# Patient Record
Sex: MALE | Race: WHITE | NOT HISPANIC OR LATINO | URBAN - METROPOLITAN AREA
[De-identification: names, ages, dates, MRNs, and addresses within clinical notes are randomized per-mention and may not be internally consistent; named-entity substitution may affect disease eponyms.]

---

## 2018-02-02 ENCOUNTER — HOSPITAL ENCOUNTER (EMERGENCY)
Facility: CLINIC | Age: 28
Discharge: OTHER INSTITUTION | End: 2018-02-03
Attending: EMERGENCY MEDICINE | Admitting: EMERGENCY MEDICINE
Payer: COMMERCIAL

## 2018-02-02 DIAGNOSIS — F29 PSYCHOSIS, UNSPECIFIED PSYCHOSIS TYPE (H): ICD-10-CM

## 2018-02-02 DIAGNOSIS — F19.10 DRUG ABUSE (H): ICD-10-CM

## 2018-02-02 PROCEDURE — 90791 PSYCH DIAGNOSTIC EVALUATION: CPT

## 2018-02-02 PROCEDURE — 80307 DRUG TEST PRSMV CHEM ANLYZR: CPT | Performed by: EMERGENCY MEDICINE

## 2018-02-02 PROCEDURE — 99285 EMERGENCY DEPT VISIT HI MDM: CPT | Mod: 25

## 2018-02-02 ASSESSMENT — ENCOUNTER SYMPTOMS
CONFUSION: 1
HALLUCINATIONS: 0
NERVOUS/ANXIOUS: 1

## 2018-02-03 ENCOUNTER — APPOINTMENT (OUTPATIENT)
Dept: GENERAL RADIOLOGY | Facility: CLINIC | Age: 28
End: 2018-02-03
Attending: EMERGENCY MEDICINE
Payer: COMMERCIAL

## 2018-02-03 ENCOUNTER — HOSPITAL ENCOUNTER (INPATIENT)
Facility: CLINIC | Age: 28
LOS: 3 days | Discharge: HOME | DRG: 880 | End: 2018-02-06
Attending: PSYCHIATRY & NEUROLOGY | Admitting: PSYCHIATRY & NEUROLOGY
Payer: COMMERCIAL

## 2018-02-03 ENCOUNTER — HOSPITAL ENCOUNTER (EMERGENCY)
Facility: CLINIC | Age: 28
Discharge: ANOTHER HOSPITAL | End: 2018-02-03
Attending: EMERGENCY MEDICINE | Admitting: EMERGENCY MEDICINE
Payer: COMMERCIAL

## 2018-02-03 VITALS
SYSTOLIC BLOOD PRESSURE: 122 MMHG | DIASTOLIC BLOOD PRESSURE: 100 MMHG | TEMPERATURE: 98.5 F | BODY MASS INDEX: 25.77 KG/M2 | RESPIRATION RATE: 18 BRPM | WEIGHT: 180 LBS | OXYGEN SATURATION: 99 % | HEART RATE: 129 BPM | HEIGHT: 70 IN

## 2018-02-03 VITALS
TEMPERATURE: 99.7 F | OXYGEN SATURATION: 99 % | SYSTOLIC BLOOD PRESSURE: 121 MMHG | DIASTOLIC BLOOD PRESSURE: 76 MMHG | HEART RATE: 98 BPM | RESPIRATION RATE: 19 BRPM

## 2018-02-03 DIAGNOSIS — F29 PSYCHOSIS, UNSPECIFIED PSYCHOSIS TYPE (H): ICD-10-CM

## 2018-02-03 DIAGNOSIS — R00.0 TACHYCARDIA: ICD-10-CM

## 2018-02-03 LAB
ALBUMIN SERPL-MCNC: 4.6 G/DL (ref 3.4–5)
ALP SERPL-CCNC: 96 U/L (ref 40–150)
ALT SERPL W P-5'-P-CCNC: 20 U/L (ref 0–70)
AMPHETAMINES UR QL SCN: POSITIVE
ANION GAP SERPL CALCULATED.3IONS-SCNC: 11 MMOL/L (ref 3–14)
AST SERPL W P-5'-P-CCNC: 21 U/L (ref 0–45)
BARBITURATES UR QL: NEGATIVE
BASOPHILS # BLD AUTO: 0.1 10E9/L (ref 0–0.2)
BASOPHILS NFR BLD AUTO: 0.4 %
BENZODIAZ UR QL: NEGATIVE
BILIRUB SERPL-MCNC: 1.5 MG/DL (ref 0.2–1.3)
BUN SERPL-MCNC: 11 MG/DL (ref 7–30)
CALCIUM SERPL-MCNC: 9.3 MG/DL (ref 8.5–10.1)
CANNABINOIDS UR QL SCN: POSITIVE
CHLORIDE SERPL-SCNC: 102 MMOL/L (ref 94–109)
CO2 SERPL-SCNC: 23 MMOL/L (ref 20–32)
COCAINE UR QL: NEGATIVE
CREAT SERPL-MCNC: 0.78 MG/DL (ref 0.66–1.25)
DIFFERENTIAL METHOD BLD: ABNORMAL
EOSINOPHIL # BLD AUTO: 0.1 10E9/L (ref 0–0.7)
EOSINOPHIL NFR BLD AUTO: 0.4 %
ERYTHROCYTE [DISTWIDTH] IN BLOOD BY AUTOMATED COUNT: 12.7 % (ref 10–15)
ETHANOL SERPL-MCNC: <0.01 G/DL
GFR SERPL CREATININE-BSD FRML MDRD: >90 ML/MIN/1.7M2
GLUCOSE SERPL-MCNC: 100 MG/DL (ref 70–99)
HCT VFR BLD AUTO: 49.1 % (ref 40–53)
HGB BLD-MCNC: 16.5 G/DL (ref 13.3–17.7)
IMM GRANULOCYTES # BLD: 0.1 10E9/L (ref 0–0.4)
IMM GRANULOCYTES NFR BLD: 0.3 %
LIPASE SERPL-CCNC: 289 U/L (ref 73–393)
LYMPHOCYTES # BLD AUTO: 1.7 10E9/L (ref 0.8–5.3)
LYMPHOCYTES NFR BLD AUTO: 10.4 %
MAGNESIUM SERPL-MCNC: 2.5 MG/DL (ref 1.6–2.3)
MCH RBC QN AUTO: 29.5 PG (ref 26.5–33)
MCHC RBC AUTO-ENTMCNC: 33.6 G/DL (ref 31.5–36.5)
MCV RBC AUTO: 88 FL (ref 78–100)
MONOCYTES # BLD AUTO: 1.3 10E9/L (ref 0–1.3)
MONOCYTES NFR BLD AUTO: 8.2 %
NEUTROPHILS # BLD AUTO: 12.9 10E9/L (ref 1.6–8.3)
NEUTROPHILS NFR BLD AUTO: 80.3 %
NRBC # BLD AUTO: 0 10*3/UL
NRBC BLD AUTO-RTO: 0 /100
OPIATES UR QL SCN: NEGATIVE
PCP UR QL SCN: NEGATIVE
PLATELET # BLD AUTO: 350 10E9/L (ref 150–450)
POTASSIUM SERPL-SCNC: 3.3 MMOL/L (ref 3.4–5.3)
PROT SERPL-MCNC: 8 G/DL (ref 6.8–8.8)
RBC # BLD AUTO: 5.6 10E12/L (ref 4.4–5.9)
SODIUM SERPL-SCNC: 136 MMOL/L (ref 133–144)
WBC # BLD AUTO: 16.1 10E9/L (ref 4–11)

## 2018-02-03 PROCEDURE — 85025 COMPLETE CBC W/AUTO DIFF WBC: CPT | Performed by: EMERGENCY MEDICINE

## 2018-02-03 PROCEDURE — 96360 HYDRATION IV INFUSION INIT: CPT

## 2018-02-03 PROCEDURE — 96361 HYDRATE IV INFUSION ADD-ON: CPT

## 2018-02-03 PROCEDURE — 25000125 ZZHC RX 250: Performed by: EMERGENCY MEDICINE

## 2018-02-03 PROCEDURE — 99285 EMERGENCY DEPT VISIT HI MDM: CPT | Mod: 25

## 2018-02-03 PROCEDURE — 25000132 ZZH RX MED GY IP 250 OP 250 PS 637: Performed by: EMERGENCY MEDICINE

## 2018-02-03 PROCEDURE — 71046 X-RAY EXAM CHEST 2 VIEWS: CPT

## 2018-02-03 PROCEDURE — 12400001 ZZH R&B MH UMMC

## 2018-02-03 PROCEDURE — 80053 COMPREHEN METABOLIC PANEL: CPT | Performed by: EMERGENCY MEDICINE

## 2018-02-03 PROCEDURE — 25000128 H RX IP 250 OP 636: Performed by: EMERGENCY MEDICINE

## 2018-02-03 PROCEDURE — 25000132 ZZH RX MED GY IP 250 OP 250 PS 637: Performed by: PSYCHIATRY & NEUROLOGY

## 2018-02-03 PROCEDURE — 93005 ELECTROCARDIOGRAM TRACING: CPT

## 2018-02-03 PROCEDURE — 93005 ELECTROCARDIOGRAM TRACING: CPT | Mod: 76

## 2018-02-03 PROCEDURE — 83735 ASSAY OF MAGNESIUM: CPT | Performed by: EMERGENCY MEDICINE

## 2018-02-03 PROCEDURE — 83690 ASSAY OF LIPASE: CPT | Performed by: EMERGENCY MEDICINE

## 2018-02-03 PROCEDURE — 80320 DRUG SCREEN QUANTALCOHOLS: CPT | Performed by: EMERGENCY MEDICINE

## 2018-02-03 RX ORDER — POTASSIUM CHLORIDE 1.5 G/1.58G
40 POWDER, FOR SOLUTION ORAL ONCE
Status: COMPLETED | OUTPATIENT
Start: 2018-02-03 | End: 2018-02-03

## 2018-02-03 RX ORDER — NICOTINE 21 MG/24HR
1 PATCH, TRANSDERMAL 24 HOURS TRANSDERMAL DAILY
Status: DISCONTINUED | OUTPATIENT
Start: 2018-02-04 | End: 2018-02-06 | Stop reason: HOSPADM

## 2018-02-03 RX ORDER — TRAZODONE HYDROCHLORIDE 50 MG/1
50 TABLET, FILM COATED ORAL
Status: DISCONTINUED | OUTPATIENT
Start: 2018-02-03 | End: 2018-02-06 | Stop reason: HOSPADM

## 2018-02-03 RX ORDER — ALUMINA, MAGNESIA, AND SIMETHICONE 2400; 2400; 240 MG/30ML; MG/30ML; MG/30ML
30 SUSPENSION ORAL EVERY 4 HOURS PRN
Status: DISCONTINUED | OUTPATIENT
Start: 2018-02-03 | End: 2018-02-06 | Stop reason: HOSPADM

## 2018-02-03 RX ORDER — BISACODYL 10 MG
10 SUPPOSITORY, RECTAL RECTAL DAILY PRN
Status: DISCONTINUED | OUTPATIENT
Start: 2018-02-03 | End: 2018-02-06 | Stop reason: HOSPADM

## 2018-02-03 RX ORDER — SODIUM CHLORIDE 9 MG/ML
1000 INJECTION, SOLUTION INTRAVENOUS CONTINUOUS
Status: DISCONTINUED | OUTPATIENT
Start: 2018-02-03 | End: 2018-02-03 | Stop reason: HOSPADM

## 2018-02-03 RX ORDER — POTASSIUM CL/LIDO/0.9 % NACL 10MEQ/0.1L
10 INTRAVENOUS SOLUTION, PIGGYBACK (ML) INTRAVENOUS
Status: DISCONTINUED | OUTPATIENT
Start: 2018-02-03 | End: 2018-02-03 | Stop reason: HOSPADM

## 2018-02-03 RX ORDER — ACETAMINOPHEN 325 MG/1
650 TABLET ORAL EVERY 4 HOURS PRN
Status: DISCONTINUED | OUTPATIENT
Start: 2018-02-03 | End: 2018-02-06 | Stop reason: HOSPADM

## 2018-02-03 RX ORDER — NICOTINE 21 MG/24HR
1 PATCH, TRANSDERMAL 24 HOURS TRANSDERMAL DAILY
Status: DISCONTINUED | OUTPATIENT
Start: 2018-02-03 | End: 2018-02-03 | Stop reason: HOSPADM

## 2018-02-03 RX ORDER — HYDROXYZINE HYDROCHLORIDE 25 MG/1
25-50 TABLET, FILM COATED ORAL EVERY 4 HOURS PRN
Status: DISCONTINUED | OUTPATIENT
Start: 2018-02-03 | End: 2018-02-06 | Stop reason: HOSPADM

## 2018-02-03 RX ORDER — METOPROLOL TARTRATE 50 MG
50 TABLET ORAL ONCE
Status: COMPLETED | OUTPATIENT
Start: 2018-02-03 | End: 2018-02-03

## 2018-02-03 RX ORDER — OLANZAPINE 10 MG/2ML
10 INJECTION, POWDER, FOR SOLUTION INTRAMUSCULAR
Status: DISCONTINUED | OUTPATIENT
Start: 2018-02-03 | End: 2018-02-06 | Stop reason: HOSPADM

## 2018-02-03 RX ORDER — OLANZAPINE 10 MG/1
10 TABLET ORAL
Status: DISCONTINUED | OUTPATIENT
Start: 2018-02-03 | End: 2018-02-06 | Stop reason: HOSPADM

## 2018-02-03 RX ADMIN — POTASSIUM CHLORIDE 40 MEQ: 1.5 POWDER, FOR SOLUTION ORAL at 12:06

## 2018-02-03 RX ADMIN — NICOTINE POLACRILEX 2 MG: 2 GUM, CHEWING ORAL at 19:11

## 2018-02-03 RX ADMIN — NICOTINE 1 PATCH: 14 PATCH, EXTENDED RELEASE TRANSDERMAL at 15:24

## 2018-02-03 RX ADMIN — METOPROLOL TARTRATE 50 MG: 50 TABLET, FILM COATED ORAL at 14:40

## 2018-02-03 RX ADMIN — OLANZAPINE 10 MG: 10 TABLET, FILM COATED ORAL at 19:11

## 2018-02-03 RX ADMIN — SODIUM CHLORIDE 1000 ML: 9 INJECTION, SOLUTION INTRAVENOUS at 09:53

## 2018-02-03 RX ADMIN — LIDOCAINE HYDROCHLORIDE 30 ML: 20 SOLUTION ORAL; TOPICAL at 09:47

## 2018-02-03 RX ADMIN — ACETAMINOPHEN 650 MG: 325 TABLET, FILM COATED ORAL at 19:11

## 2018-02-03 ASSESSMENT — ENCOUNTER SYMPTOMS
CHILLS: 0
COUGH: 0
FEVER: 0
SHORTNESS OF BREATH: 1

## 2018-02-03 NOTE — ED NOTES
Pt upset that he is being transferred to inpt. MD notified. MD talked with pt for quite a while. Lunch box brought in. Requested nicotine patch. MD notified.

## 2018-02-03 NOTE — ED NOTES
Pt with no IV in, given oral K+. Pt hooked back up to monitor, had all monitors off d/t being anxious. Explained reasoning for potassium and monitoring. Pt calm and cooperative. Virtual DEC at bedside currently.

## 2018-02-03 NOTE — ED NOTES
Patient declines lab draw, even if done through his IV. He also declines Iv fluids. He is agreeable to GI cocktail as well as a chest x-ray.

## 2018-02-03 NOTE — ED NOTES
Patient awaiting x-ray transport and while RN was not in the room he removed all of his monitors and his IV. Bleeding controlled left antecubital area by ERT.

## 2018-02-03 NOTE — ED NOTES
Bed: ED23  Expected date:   Expected time:   Means of arrival:   Comments:  Yoana-26 yo male CP and SOB
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
## 2018-02-03 NOTE — ED NOTES
Patient agreed to lab draw, which was completed, with persitent persuasion after declining multiple times and after agreeing to lab draw when Dr Aranda talked to him a second time.      Also of note, he is noted to by mumbling under his breath when I am in the room and when I step out. He reports he was hearing things at detox but he thinks it was real voices. He did respond verbally and inappropriately to his grandfather who is not in the room, or in the ED as far as I know. He denies hearing things or seeing things now, and states he was responding to something he heard somewhere else in the ED or maybe in the [facility]ix.

## 2018-02-03 NOTE — ED NOTES
"Spoke with mom Nuha. [phone]. She reports that pt was seen at [facility] yesterday after he \"fell into a full blown Psychosis, arming himself and running around the neighborhood\". Nuha reports that pt has been \"off\" for weeks but that yesterday, she feels that this is a drug induced psychosis event. She reports previously, pt had taken an increased amount of riddelin or adderal that he is prescribed. She also doesn't rule out street drugs but impresses that his choice of drug is a stimulant.  Nuha reports that pt has history of ADHD and anxiety. This is intensified when pt has these episodes or takes too much meds. She believes he has not slept for at least three nights. Before this, he had been sober for a year. Today when talking to him, she reports that he sounds worried about his loved ones and seems very anxious. She reports that the antipsychotics work best over antianxiety meds. He has had a recent breakup and lost his living arrangements as well. She believes that this is cause for this episode. Mom does not want to bring him home until he is sober.   "
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
## 2018-02-03 NOTE — ED NOTES
"Spoke with patients Mother. She has verbalized some concerns regarding patients mental state. She says in the past few weeks patient has become increasingly paranoid and calls her constantly telling her people are after after her and she should hide. He tells her the code word is \"bagdhad\" if she does get caught. She says she is very concerned about his safety and says he has been going outside naked and running through the neighborhood. This information has been relayed to MD.   "
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
## 2018-02-03 NOTE — IP AVS SNAPSHOT
MRN:[id number]                      After Visit Summary   2/3/2018    Galen Jarrett    MRN: [id number]           Thank you!     Thank you for choosing [facility] for your care. Our goal is always to provide you with excellent care.        Patient Information     Date Of Birth          1990        Designated Caregiver       Most Recent Value    Caregiver    Will someone help with your care after discharge? no      About your hospital stay     You were admitted on:  February 3, 2018 You last received care in the:  [facility]    You were discharged on:  February 6, 2018       Who to Call     For medical emergencies, please call 911.  For non-urgent questions about your medical care, please call your primary care provider or clinic, None          Attending Provider     Provider Specialty    Ze Donis MD Psychiatry       Primary Care Provider Fax #    Physician No Ref-Primary [phone]      Further instructions from your care team        Behavioral Discharge Planning and Instructions                                                                                                  Health partners  Summary:  You were admitted on 2/3/2018 due to psychosis in the context of substance abuse. You were treated by Dr. Ze Donis MD and discharged on 2/6/2018  from [facility]      Principal Diagnosis: Mood disorder, Substance Abuse Disorder      Health Care Follow-up Appointments:   [facility]  [phone]  [address] [facility] [place]- patient will utilize the walk-in hours to see provider      Attend all scheduled appointments with your outpatient providers. Call at least 24 hours in advance if you need to reschedule an appointment to ensure continued access to your outpatient providers.   Major Treatments, Procedures and Findings:  You were provided with: a psychiatric assessment and medication evaluation and/or  "management    Symptoms to Report: feeling more aggressive, increased confusion, losing more sleep, mood getting worse or thoughts of suicide    Early warning signs can include: increased depression or anxiety sleep disturbances increased thoughts or behaviors of suicide or self-harm  increased unusual thinking, such as paranoia or hearing voices    Safety and Wellness:  Take all medicines as directed.  Make no changes unless your doctor suggests them.      Follow treatment recommendations.  Refrain from alcohol and non-prescribed drugs.  If there is a concern for safety, call 911.    Resources:   Crisis Intervention: 591.876.5658 or 489-874-2535 (TTY: 604.795.6418).  Call anytime for help.  Suicide Awareness Voices of Education (SAVE) (www.save.org): 582-103-SAVE (7230)  [facility] Crisis (COPE) Response - Adult [phone]    The treatment team has appreciated the opportunity to work with you.     If you have any questions or concerns our unit number is [phone].  You may be receiving a follow-up phone call within the next three days from a representative from behavioral [facility].          Pending Results     No orders found from 2/1/2018 to 2/4/2018.            Statement of Approval     Ordered          02/05/18 1236  I have reviewed and agree with all the recommendations and orders detailed in this document.  EFFECTIVE NOW     Approved and electronically signed by:  Ze Donis MD             Admission Information     Date & Time Provider Department Dept. Phone    2/3/2018 Ze Donis MD  [facility] [phone]      Your Vitals Were     Blood Pressure Pulse Temperature Respirations Height Weight    154/69 72 98.6  F (37  C) (Oral) 17 1.803 m (5' 11\") 68 kg (150 lb)    BMI (Body Mass Index)                   20.92 kg/m2           MyChart Information     [facility] lets you send messages to your doctor, view your test results, renew your prescriptions, schedule appointments and more. To " "sign up, go to [email or web address] . Click on \"Log in\" on the left side of the screen, which will take you to the Welcome page. Then click on \"Sign up Now\" on the right side of the page.     You will be asked to enter the access code listed below, as well as some personal information. Please follow the directions to create your username and password.     Your access code is: [email or web address]  Expires: 2018  6:27 AM     Your access code will  in 90 days. If you need help or a new code, please call your [facility] clinic or [phone].        Care EveryWhere ID     This is your Care EveryWhere ID. This could be used by other organizations to access your [facility] medical records  [id number]        Equal Access to Services     ROMINA VELARDE : Akosua Valenzuela, wakelsey ignacio, qaeffie venegasalgianluca schneider, carson jett . So [facility] [phone].    ATENCIÓN: Si habla español, tiene a waters disposición servicios gratuitos de asistencia lingüística. Zaria al [phone].    We comply with applicable federal civil rights laws and [place] laws. We do not discriminate on the basis of race, color, national origin, age, disability, sex, sexual orientation, or gender identity.               Review of your medicines      STOP taking     CONCERTA PO                    Protect others around you: Learn how to safely use, store and throw away your medicines at www.disposemymeds.org.             Medication List: This is a list of all your medications and when to take them. Check marks below indicate your daily home schedule. Keep this list as a reference.      Notice     You have not been prescribed any medications.      "

## 2018-02-03 NOTE — IP AVS SNAPSHOT
[address] [address]    Phone:  [phone]                                       After Visit Summary   2/3/2018    Galen Jarrett    MRN: [id number]           After Visit Summary Signature Page     I have received my discharge instructions, and my questions have been answered. I have discussed any challenges I see with this plan with the nurse or doctor.    ..........................................................................................................................................  Patient/Patient Representative Signature      ..........................................................................................................................................  Patient Representative Print Name and Relationship to Patient    ..................................................               ................................................  Date                                            Time    ..........................................................................................................................................  Reviewed by Signature/Title    ...................................................              ..............................................  Date                                                            Time

## 2018-02-04 LAB
AMPHETAMINES UR QL SCN: POSITIVE
BARBITURATES UR QL: NEGATIVE
BENZODIAZ UR QL: NEGATIVE
CANNABINOIDS UR QL SCN: POSITIVE
COCAINE UR QL: NEGATIVE
ETHANOL UR QL SCN: NEGATIVE
INTERPRETATION ECG - MUSE: NORMAL
INTERPRETATION ECG - MUSE: NORMAL
OPIATES UR QL SCN: NEGATIVE
PCP UR QL SCN: NEGATIVE

## 2018-02-04 PROCEDURE — 25000132 ZZH RX MED GY IP 250 OP 250 PS 637: Performed by: PSYCHIATRY & NEUROLOGY

## 2018-02-04 PROCEDURE — 80320 DRUG SCREEN QUANTALCOHOLS: CPT | Performed by: PSYCHIATRY & NEUROLOGY

## 2018-02-04 PROCEDURE — 12400001 ZZH R&B MH UMMC

## 2018-02-04 PROCEDURE — 99223 1ST HOSP IP/OBS HIGH 75: CPT | Mod: AI | Performed by: PSYCHIATRY & NEUROLOGY

## 2018-02-04 PROCEDURE — 80307 DRUG TEST PRSMV CHEM ANLYZR: CPT | Performed by: PSYCHIATRY & NEUROLOGY

## 2018-02-04 RX ORDER — QUETIAPINE FUMARATE 100 MG/1
100 TABLET, FILM COATED ORAL AT BEDTIME
Status: DISCONTINUED | OUTPATIENT
Start: 2018-02-04 | End: 2018-02-06 | Stop reason: HOSPADM

## 2018-02-04 RX ADMIN — ACETAMINOPHEN 650 MG: 325 TABLET, FILM COATED ORAL at 13:58

## 2018-02-04 RX ADMIN — NICOTINE POLACRILEX 2 MG: 2 GUM, CHEWING ORAL at 13:53

## 2018-02-04 RX ADMIN — NICOTINE POLACRILEX 2 MG: 2 GUM, CHEWING ORAL at 16:13

## 2018-02-04 RX ADMIN — ACETAMINOPHEN 650 MG: 325 TABLET, FILM COATED ORAL at 20:52

## 2018-02-04 RX ADMIN — NICOTINE POLACRILEX 2 MG: 2 GUM, CHEWING ORAL at 09:17

## 2018-02-04 RX ADMIN — QUETIAPINE FUMARATE 100 MG: 100 TABLET ORAL at 22:20

## 2018-02-04 RX ADMIN — NICOTINE 1 PATCH: 14 PATCH, EXTENDED RELEASE TRANSDERMAL at 09:21

## 2018-02-04 RX ADMIN — NICOTINE POLACRILEX 2 MG: 2 GUM, CHEWING ORAL at 12:32

## 2018-02-04 RX ADMIN — NICOTINE POLACRILEX 2 MG: 2 GUM, CHEWING ORAL at 20:52

## 2018-02-04 ASSESSMENT — ACTIVITIES OF DAILY LIVING (ADL)
GROOMING: INDEPENDENT
DRESS: SCRUBS (BEHAVIORAL HEALTH)
ORAL_HYGIENE: INDEPENDENT
LAUNDRY: WITH SUPERVISION
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
## 2018-02-04 NOTE — H&P
"[facility]  Psychiatric History and Physical      Patient name: Galen Jarrett   MRN: [id number]    Age: 27 year old    YOB: 1990    Identifying information:   The patient is a 27 year old  male who is currently a poor historian as he attempts to be dismissive during the interview process.  I spoke with his mother over the telephone who provided me with collateral information today.    Chief complaint:  \"I told that another lady yesterday all about it and she said she would call you can give you an update.  So why don't you go read her notes because I don't feel like talking about it again.\"    History of present illness: The patient has a history of substance related mood and psychotic disorders who was referred to our behavioral health unit for management of psychosis.  Emergency room records mention that there was concern that the patient would not be able to adequately care for himself given his current disorganized and psychotic mental state.  His urine drug screen was positive for stimulants and cannabis.  He was placed on a 72 hour hold and transferred to our behavioral health unit.  I attempted to meet with the patient's who seemed guarded and defiant.  He explained briefly that he is prescribed Concerta and took 1 additional tablet which prompted changes in his mood and thought process which contributed to his current hospitalization.  He tells me that he feels better now and has no mental health related concerns.  As I was attempting to ask questions regarding how the patient may have accidentally taken one extra Concerta, he began to stare at my shoes and commented \"I am not going to talk to anyone who wears zags boots so just find me a different doctor.\"  He was not able to clarify what he meant by \"zag boots.\"  He then laid down in bed and pulled the blankets over his head and disengaged from the interview.    His mother reveals that the " patient was recently doing well as he maintained nearly 1 year of sobriety from substances.  He had managed to maintain part-time employment and was reestablishing positive relationships with family members.  He was residing with his girlfriend until the relationship ended in December 2017.  She has noticed a gradual decline since then which she suspects is related to relapse on stimulants and progressive usage over the past couple of months.  More recently, the patient was staying with his mother who noted that the patient had not slept over the past 3-4 nights.  On Friday, he had apparently barricaded himself in the house and reported delusions of a home invasion occurring and was frantically asking his mother to drive him away from the home.  He had armed himself with a small machete as a means of protecting himself from these perceived invaders.  His mother suspected substance-induced paranoia as she has seen similar symptoms in the past during substance usage.  At some point, his anxiety was so severe that he completed their home and was not appropriately dressed for the weather.  He managed to find his way to a strip mall one to 2 miles away from their home at which point he contacted his mom and asked to be picked up.  She then transported him to the emergency room where he was referred to a detox facility.  After his admission there, he reported chest pain which resulted in his transfer back to the emergency room.  He was then noted to be disorganized and psychotic which resulted in his referral to the inpatient mental health setting.  The patient's mother adds that at baseline and while maintaining sobriety, the patient maintains odd and suspicious thought processes which are significantly enhanced when using illicit substances.  She also mentions that he has responded favorably to Seroquel in the past as well as other antipsychotic medications.    Psychiatric History:    The patient's mother reports at  "least one prior inpatient mental health hospitalization along with 2 commitments for treatment however not clear if mental health related or addiction related.  She was able to identify a diagnosis of ADHD, depression, and anxiety.  One prior suicide attempt with the patient attempted to cut his wrist a few years ago.    Substance Use History:    Primary drug of choice is stimulants.  His mother was not able to clarify the specific type of stimulant.  He has also been smoking cannabis more than usual.  He has been to treatment approximately 9 times since the age of 19.  No significant usage of alcohol or benzodiazepines reported.  No history of intravenous drug usage reported.    Medical History: No active issues      No current facility-administered medications on file prior to encounter.   Current Outpatient Prescriptions on File Prior to Encounter:  Methylphenidate HCl (CONCERTA PO) Take 54 mg by mouth daily         Social History:  Refer to the psychosocial assessment completed by our .     Family History:    Records reveal that the patient reported an uncle who committed suicide by shooting himself with a gun    Medical review of systems: 10 systems were attempted to be reviewed however the patient did not cooperate    Physical Exam:    B/P: 137/77[standing[, T: 97.7, P: 97, R: 16  Estimated body mass index is 20.92 kg/(m^2) as calculated from the following:    Height as of this encounter: 1.803 m (5' 11\").    Weight as of this encounter: 68 kg (150 lb).    The rest of the physical examination was reviewed in the emergency room note completed by the emergency room physician.      Mental status examination:  Appearance: He was laying in bed however did not appear to be sleeping.  He sat up in bed quickly.  He was dressed in hospital scrubs.  Hair was long and slightly unkept.  Attitude: He initially attempted to be cooperative however quickly became dismissive and defiant and eventually was " "uncooperative with the remainder of the interview.  Eye Contact: Fair  Mood: \"better now\"  Affect: Slightly guarded, did not appear overtly manic or depressed.  Speech:  Normal rates, tone, latency, volume. Not pushed or pressured.  Psychomotor Behavior:  No psychomotor abnormalities noted  Thought Process: Mostly linear  Associations:  no loose associations noted  Thought Content: He seemed guarded however did not discuss any overt paranoid delusions, Denies auditory or visual hallucinations. Denies suicidal Ideations. Denies homicidal ideations.  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  The patient has been admitted to our behavioral health unit under a 72 hour hold for management of psychosis which was thought to be impairing his ability to adequately engage in self-care needs. I will continue his hold over the weekend until he is reassessed by his treatment team on Monday.    2.  Seroquel will be made available this evening noting prominent insomnia over the past several days and residual psychosis.   His mother recalls that the patient has tolerated Seroquel well in the past and exhibited benefit from the medication.    3. Psychosocial treatments to be addressed with social work consult and groups.  He would benefit from a chemical health assessment.    4.  Anticipate a hospital stay of approximately one week as we " target reduction of psychosis, mood stabilization, and formulate a plan of care to successfully transition his care out of the hospital.
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
## 2018-02-04 NOTE — PHARMACY-ADMISSION MEDICATION HISTORY
"Admission Medication History status for the 2/3/2018 admission is complete.  See EPIC admission navigator for Prior to Admission medications.    Medication history sources:  Patient, [facility] in [place]     Medication history source reliability: Good    Medication adherence:  Good    Changes made to PTA medication list (reason)  Added: None   Deleted: None  Changed: None    Additional medication history information (including reliability of information, actions taken by pharmacist):   -Patient knew name and dose of his Concerta. Says he uses it to \"take his cravings away.\"  -Patient says he used to take 2 mg Ativan but he does not have a prescription for it now. Claims that it works the best for him. He thinks it is the least addictive and most calming.  -Called [facility] to verify Concerta dose. Last filled a quantity of 30 on 1/16/18.  -[facility] says the only other med they filled for him recently was Guanfacine 1 mg (take 1 tab po daily) on 11/25/17, but patient did not mention taking this med.  -Asked [facility] if they have filled Ativan for him. [facility] says he has not filled it with them and that their records go back 18 months.    Time spent in this activity: 25 minutes     Medication history completed by: Clarita Chapa, PD2 Pharmacy Intern    Prior to Admission medications    Medication Sig Last Dose Taking? Auth Provider   Methylphenidate HCl (CONCERTA PO) Take 54 mg by mouth daily  Past Week at Unknown time Yes Reported, Patient     "

## 2018-02-04 NOTE — PROGRESS NOTES
02/04/18 1358   Behavioral Health   Hallucinations denies / not responding to hallucinations   Thinking intact   Orientation time: oriented;date: oriented;place: oriented;person: oriented   Memory baseline memory   Insight insight appropriate to situation   Judgement impaired   Eye Contact at examiner   Affect full range affect   Mood mood is calm   Physical Appearance/Attire appears stated age   Hygiene well groomed   Suicidality other (see comments)  (Denies)   1. Wish to be Dead No   Wish to be Dead Description NO   2. Non-Specific Active Suicidal Thoughts  No   Non-Specific Active Suicidal Thought Description No   Psycho Education   Type of Intervention 1:1 intervention   Response participates, initiates socially appropriate   Hours 0.5   Activities of Daily Living   Hygiene/Grooming independent   Oral Hygiene independent   Dress scrubs (behavioral health)   Laundry with supervision   Room Organization independent   Activity   Activity Assistance Provided independent   Pt spent the morning sleeping and he was up for lunch. He was observed on the phone talking to his mother. He appears social with peers in the lounge and he denies suicidal ideations. He appears cooperative on approach.

## 2018-02-04 NOTE — PLAN OF CARE
Problem: Cognitive Impairment (Psychotic Signs/Symptoms) (Adult)  Goal: Improved Thought Clarity/Organization (Psychotic Signs/Symptoms)  Outcome: Improving  Some small improvement in patient reporting not hearing voices at the time of admission. However, he still believes that he is in danger, see progress note.

## 2018-02-04 NOTE — PROGRESS NOTES
Pt's mother called Nuha called phone number [phone] pt has not signed an HUY for her mother.  Mom ststes that pt can keep it together for short time.  But if you hear him on the phone he taked about controlling the abram and other psychostic statements.  Pt states that he was staying with her for 3 days prior to this admission but pt cannot go back to his mother now. Mom states has hx of commitment 3 yrs ago.

## 2018-02-05 VITALS
HEART RATE: 72 BPM | TEMPERATURE: 98.6 F | DIASTOLIC BLOOD PRESSURE: 69 MMHG | BODY MASS INDEX: 21 KG/M2 | WEIGHT: 150 LBS | RESPIRATION RATE: 17 BRPM | SYSTOLIC BLOOD PRESSURE: 154 MMHG | HEIGHT: 71 IN

## 2018-02-05 PROCEDURE — 99231 SBSQ HOSP IP/OBS SF/LOW 25: CPT | Performed by: PSYCHIATRY & NEUROLOGY

## 2018-02-05 PROCEDURE — 25000132 ZZH RX MED GY IP 250 OP 250 PS 637: Performed by: PSYCHIATRY & NEUROLOGY

## 2018-02-05 PROCEDURE — 99207 ZZC CDG-MDM COMPONENT: MEETS LOW - DOWN CODED: CPT | Performed by: PSYCHIATRY & NEUROLOGY

## 2018-02-05 PROCEDURE — 90853 GROUP PSYCHOTHERAPY: CPT

## 2018-02-05 PROCEDURE — 12400001 ZZH R&B MH UMMC

## 2018-02-05 RX ADMIN — HYDROXYZINE HYDROCHLORIDE 50 MG: 25 TABLET ORAL at 18:54

## 2018-02-05 RX ADMIN — ACETAMINOPHEN 650 MG: 325 TABLET, FILM COATED ORAL at 15:20

## 2018-02-05 RX ADMIN — NICOTINE POLACRILEX 2 MG: 2 GUM, CHEWING ORAL at 10:57

## 2018-02-05 RX ADMIN — ACETAMINOPHEN 650 MG: 325 TABLET, FILM COATED ORAL at 19:40

## 2018-02-05 RX ADMIN — NICOTINE 1 PATCH: 14 PATCH, EXTENDED RELEASE TRANSDERMAL at 08:55

## 2018-02-05 RX ADMIN — ACETAMINOPHEN 650 MG: 325 TABLET, FILM COATED ORAL at 10:57

## 2018-02-05 RX ADMIN — NICOTINE POLACRILEX 2 MG: 2 GUM, CHEWING ORAL at 08:57

## 2018-02-05 RX ADMIN — NICOTINE POLACRILEX 2 MG: 2 GUM, CHEWING ORAL at 13:42

## 2018-02-05 RX ADMIN — TRAZODONE HYDROCHLORIDE 50 MG: 50 TABLET ORAL at 21:15

## 2018-02-05 ASSESSMENT — ACTIVITIES OF DAILY LIVING (ADL)
DRESS: INDEPENDENT
ORAL_HYGIENE: INDEPENDENT
GROOMING: INDEPENDENT
LAUNDRY: WITH SUPERVISION

## 2018-02-05 NOTE — PROGRESS NOTES
Writer spoke with pt's mother Nuha. Nuha reports pt has been at [facility] many times before and has been committed and a 'stay' by this hospital. Writer contacted intake who discovered two charts with different MRN. He will try to merge the charts. (yessica Jose Jarrett MRN: [id number])    Nuha is comfortable picking pt up tomorrow at 8:30 so they can get to the walk -in hours at [facility] which opens at 9:30. She stated that she has been through this many times and has strong boundaries and pt knows it. Pt needs to come up with a plan to stay sober and mentally healthy. Nuha did say that pt has been on Ritalin for the past year and this is his first relapse in a year.

## 2018-02-05 NOTE — PROGRESS NOTES
02/05/18 1425   Behavioral Health   Hallucinations denies / not responding to hallucinations   Thinking confused   Orientation person: oriented;place: oriented;date: oriented;time: oriented   Memory baseline memory   Insight poor   Judgement impaired   Eye Contact at examiner   Affect full range affect   Mood labile   Physical Appearance/Attire attire appropriate to age and situation   Hygiene well groomed   Suicidality (denies)   1. Wish to be Dead No   2. Non-Specific Active Suicidal Thoughts  No   Enviromental Risk Factors (denies)   Self Injury (denies)   Elopement (none stated)   Activity (visible in milieu)   Speech clear;coherent   Medication Sensitivity no stated side effects   Psychomotor / Gait balanced   Psycho Education   Type of Intervention 1:1 intervention   Response participates, initiates socially appropriate   Hours 0.5   Activities of Daily Living   Hygiene/Grooming independent   Oral Hygiene independent   Dress independent   Laundry with supervision   Room Organization independent   pt upset early in the morning about his clothes being somewhere else. Pt apologized for getting upset and yelling at staff. Pt clothes were identified and the wrong ones were given back to treatment center. Pt calm rest of day. Pt eating well. Pt not attending groups but is visible in milieu and social with peers and staff. Pt showered. Pt denies SI.

## 2018-02-05 NOTE — PROGRESS NOTES
Pt had mom visit with him. Visit when well. They played card in the OT room. Pt mom brought him clothes.   Pt has been pacing all evening , has very low tolerance towards peers. Pt claimed to be anxious for most part of the evening. Was present in the milieu.

## 2018-02-05 NOTE — DISCHARGE INSTRUCTIONS
Behavioral Discharge Planning and Instructions                                                                                                  [facility]  Summary:  You were admitted on 2/3/2018 due to psychosis in the context of substance abuse. You were treated by Dr. Ze Donis MD and discharged on 2/6/2018  from [facility]      Principal Diagnosis: Mood disorder, Substance Abuse Disorder      Health Care Follow-up Appointments:   [facility]  [phone]  [address] [facility]  Chelsea Miles- patient will utilize the walk-in hours to see provider      Attend all scheduled appointments with your outpatient providers. Call at least 24 hours in advance if you need to reschedule an appointment to ensure continued access to your outpatient providers.   Major Treatments, Procedures and Findings:  You were provided with: a psychiatric assessment and medication evaluation and/or management    Symptoms to Report: feeling more aggressive, increased confusion, losing more sleep, mood getting worse or thoughts of suicide    Early warning signs can include: increased depression or anxiety sleep disturbances increased thoughts or behaviors of suicide or self-harm  increased unusual thinking, such as paranoia or hearing voices    Safety and Wellness:  Take all medicines as directed.  Make no changes unless your doctor suggests them.      Follow treatment recommendations.  Refrain from alcohol and non-prescribed drugs.  If there is a concern for safety, call 911.    Resources:   Crisis Intervention: 486.454.5208 or [phone] (TTY: 625.688.9142).  Call anytime for help.  Suicide Awareness Voices of Education (SAVE) (www.save.org): 888-511-SAVE (7283)  [facility] Crisis (COPE) Response - Adult [phone]    The treatment team has appreciated the opportunity to work with you.     If you have any questions or concerns our unit number is [phone].  You may be  receiving a follow-up phone call within the next three days from a representative from behavioral health.

## 2018-02-05 NOTE — PROGRESS NOTES
INITIAL OT ATTENDANCE:  Participated in structured leisure group activity focused on building communication skills (2.0 sessions).  Pt answered questions appropriately, on topic and followed two step verbal directions with occasional verbal reminders.    At times he seemed to become impatient when a male peer was talking, as demonstrated by attempts to interrupt peer, loud sighing and shifting positions in chair.   Towards the end of group, pt shared information with peers and staff about a breathing activity he does to help improve his health.  He described taking three deep, mindful belly breaths, followed by slow exhales at hourly intervals throughout the day.

## 2018-02-06 PROCEDURE — 99207 ZZC NO CHARGE VISIT/PATIENT NOT SEEN: CPT | Performed by: PSYCHIATRY & NEUROLOGY

## 2018-02-06 ASSESSMENT — ACTIVITIES OF DAILY LIVING (ADL)
GROOMING: INDEPENDENT
ORAL_HYGIENE: INDEPENDENT
DRESS: STREET CLOTHES;INDEPENDENT

## 2018-02-06 NOTE — PLAN OF CARE
Problem: General Plan of Care (Inpatient Behavioral)  Goal: Discharge Planning  Outcome: Adequate for Discharge Date Met: 02/06/18  Galen will be discharged when his mother arrives to pick him up.  He is not on any medications.  He denies being suicidal.  Mother will take him to the [facility] clinic today for a walk in appointment.  He was cooperative with all discharge procedures and signed his valuables form and discharge instruction form.

## 2018-02-06 NOTE — PROGRESS NOTES
"[facility], [place]   Psychiatric Progress Note        Interim History:   The patient's care was discussed with the treatment team during the daily team meeting and/or staff's chart notes were reviewed.  Staff report patient showed some emotional lability, but no signs of psychosis. He was pleasant and polite during today's visit with me, admitted that he took too much of Concerta, although, clearly minimized the extent of it. He asked to be discharged today. I asked him to sign HUY and CTC talked to his mother (see CTC's note below).     Writer spoke with pt's mother Nuha. Nuha reports pt has been at [facility] many times before and has been committed and a 'stay' by this hospital. Writer contacted intake who discovered two charts with different MRN. He will try to merge the charts. (yessica Jose Jarrett MRN: [id number])     Nuha is comfortable picking pt up tomorrow at 8:30 so they can get to the walk -in hours at [facility] which opens at 9:30. She stated that she has been through this many times and has strong boundaries and pt knows it. Pt needs to come up with a plan to stay sober and mentally healthy. Nuha did say that pt has been on Ritalin for the past year and this is his first relapse in a year.       Medications:       QUEtiapine  100 mg Oral At Bedtime     nicotine   Transdermal Q8H     nicotine   Transdermal Daily     nicotine  1 patch Transdermal Daily          Allergies:     Allergies   Allergen Reactions     Amoxicillin Itching          Labs:   No results found for this or any previous visit (from the past 24 hour(s)).       Psychiatric Examination:     /69  Pulse 72  Temp 98.6  F (37  C) (Oral)  Resp 17  Ht 1.803 m (5' 11\")  Wt 68 kg (150 lb)  BMI 20.92 kg/m2  Weight is 150 lbs 0 oz  Body mass index is 20.92 kg/(m^2).  Orthostatic Vitals       Most Recent      Sitting Orthostatic /89 02/05 1226    Sitting Orthostatic Pulse (bpm) 76 02/05 1226    Standing " Orthostatic /83 02/04 0921    Standing Orthostatic Pulse (bpm) 73 02/04 0921            Appearance: awake, alert and adequately groomed  Attitude:  more cooperative  Eye Contact:  good  Mood:  good  Affect:  appropriate and in normal range  Speech:  clear, coherent  Psychomotor Behavior:  no evidence of tardive dyskinesia, dystonia, or tics and intact station, gait and muscle tone  Throught Process:  linear and goal oriented  Associations:  no loose associations  Thought Content:  no evidence of suicidal ideation or homicidal ideation and no evidence of psychotic thought  Insight:  partial  Judgement:  fair  Oriented to:  time, person, and place  Attention Span and Concentration:  intact  Recent and Remote Memory:  intact         Precautions:     Behavioral Orders   Procedures     Code 1 - Restrict to Unit     Routine Programming     As clinically indicated     Status 15     Every 15 minutes.          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 Patient was scheduled to be discharged today, but his mother could not provide ride. Will be discharged tomorrow.

## 2018-02-06 NOTE — DISCHARGE SUMMARY
"Admit Date:     02/03/2018   Discharge Date:     02/06/2018      PSYCHIATRIC DISCHARGE SUMMARY      The patient was hospitalized at this hospital between 02/03/2018 and 02/06/2018, was admitted by Dr. Michele Dior and discharged by Dr. Donis.  I was absent on the day of discharge.  I met with the patient 1 time only, on 02/05/2018.      CHIEF COMPLAINT AND REASON FOR ADMISSION:  The patient is a 27-year-old  male with history of substance abuse and psychotic disorder who was referred to behavioral health unit for management of psychosis.  Emergency room records indicate that there was a concern that the patient was unable to adequately care for himself, given his current disorganized and psychotic mental state.  Urine drug screen was positive for stimulants and cannabis.  He was placed on 72-hour hold and transferred to behavioral health unit.  Dr. Dior tried to meet with the patient, who seemed to be guarded and defiant, but briefly explained that he was prescribed Concerta, took 1 additional tablet, which prompted changes in his mood and thought processes, which contributed to his current hospitalization.  The patient immediately said that he felt better, has no mental health concerns.  According to Dr. Dior's note, when he tried to ask patient more questions about how he might have accidentally taken 1 extra Concerta, patient began to stare at Dr. Dior's shoes and commented, \"I am not going to talk to anyone who wears Zags boots, so just find me a different doctor.\"  He could not clarify what he meant by Zag boots.  He then laid down on his bed, pulled the blankets over his head and disengaged from the interview.  The patient's mother contributed the patient maintained a nearly 1-year sobriety from substances and was able to maintain part-time employment, establish a positive relationship with family members, was residing with his girlfriend until the relationship ended in 12/2017.  Then she " noticed a gradual decline and suspected it was because of relapse on stimulants.  Lately, mother noted that patient could not sleep over the past 34 hours, and on Friday he apparently barricaded himself in the house, reported delusions of home invasion occurring and was frantically asking his mother to drive him away from the home.  He had armed himself with a small machete as a means of protecting himself from these perceived invaders.  Mother reported that the patient had similar substance-induced paranoia in the past.  He then escaped from home and found his way to a strip mall 1-2 miles away from the home, then called mother and asked to be picked up.  Mother transported him to emergency room, where he was referred to detox; however, upon his admission there he reported chest pain and was sent back to the emergency room and was noted to be disorganized and psychotic and was admitted to inpatient mental health.  Mother responded the patient has responded favorably to Seroquel in the past, as well as other antipsychotic medications.        PSYCHIATRIC HISTORY:  The patient reported at least 1 prior inpatient mental health hospitalization and 2 commitments for treatment; however, not clear if mental health related or addiction related.  Mother was able to identify diagnoses of ADHD, depression and anxiety and 1 prior suicide attempt when patient attempted to cut his wrist a few years ago      SUBSTANCE USE HISTORY:  The patient's drug of choice is stimulant.  Mother was unable to clarify the specific type of stimulant.  He has been in treatment approximately 9 times since the age of 19.  No significant usage ? with benzodiazepines.  No history of intravenous drug usage reported.        CONSULTATIONS:  There were no consults performed during this brief hospital stay.        LABORATORY WORK:  Showed decreased potassium 3.3, magnesium 2.5, total bilirubin 1.5.  The rest of test was unremarkable.  Glucose was 100.   White blood cell count 16.1 and absolute neutrophils 12.9.  The rest of test was unremarkable.  Ethanol g/dL was less than 0.01.  Urine drug screen positive for cannabis and amphetamines.  The rest of test was negative.      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      HOSPITAL COURSE:  After the patient came to this facility, he was admitted on a 72-hour hold and was put on 15-minute checks.  The patient presented as very anxious and at times irritable; however, calmed down and apologized for getting upset, yelling at staff because he could not find his clothes.  Participated in some groups, answered questions appropriately, followed 2-step verbal directions.  At times seemed to become impatient when male peer was talking and tried to interrupt, but during this brief hospitalization, patient did not show the presence of any psychotic symptoms.  He met with me 1 time only and during this meeting was pleasant, cooperative on approach.  Maintained fair eye contact.  Repeatedly denied suicidal or homicidal thoughts.  He said that he took 1 extra pill of his Concerta.  I had impressions that he minimized the extent of his misusing medications or possibly of abusing any illicit substances such as methamphetamines.  Our clinical treatment coordinator discussed patient's request to be discharged with his mother.  She informed mother the patient did not exhibit any psychotic symptoms anymore.  Mother says that she was comfortable picking the patient up the next day at 8:30 in the morning.  The 72-hour hold was discontinued and the patient was discharged with no medications.  He was briefly prescribed Seroquel; however, it was not given to him, as his psychotic symptoms clearly were related to his stimulant use.  The  patient was not continued on Concerta during this hospitalization.        MENTAL STATUS EXAMINATION:  The patient is awake, alert, adequately groomed, cooperative, fair eye contact.  Described his mood as good.  Affect full range, appropriate.  Speech clear and coherent.  No psychomotor agitation or retardation.  Thought process is linear, goal directed.  No evidence of psychosis, mary or hypomania.  Denied suicidal or homicidal thoughts.  Insight partial.  Judgment fair.  The patient was fully alert, oriented.  The patient was referred to healthcare followup for followup appointments to [facility] located at [address] [place] [address].  Will utilize walk-in hours to see provider Chelsea Valencia.         FRANCHESCA LESLIE MD             D: 2018   T: 2018   MT: MIGEL      Name:     SUDARSHAN BOLES   MRN:      [id number]        Account:        [id number]   :      1990           Admit Date:     2018                                  Discharge Date: 2018      Document: [id number]       cc: Chelsea Valencia NP

## 2018-02-13 ENCOUNTER — HOSPITAL ENCOUNTER (EMERGENCY)
Facility: CLINIC | Age: 28
Discharge: HOME | End: 2018-02-13
Attending: EMERGENCY MEDICINE | Admitting: EMERGENCY MEDICINE
Payer: COMMERCIAL

## 2018-02-13 VITALS
DIASTOLIC BLOOD PRESSURE: 65 MMHG | OXYGEN SATURATION: 98 % | SYSTOLIC BLOOD PRESSURE: 135 MMHG | HEART RATE: 87 BPM | RESPIRATION RATE: 16 BRPM | TEMPERATURE: 97.8 F

## 2018-02-13 DIAGNOSIS — F15.10 STIMULANT ABUSE (H): ICD-10-CM

## 2018-02-13 LAB
AMPHETAMINES UR QL SCN: NEGATIVE
BARBITURATES UR QL: NEGATIVE
BENZODIAZ UR QL: NEGATIVE
CANNABINOIDS UR QL SCN: NEGATIVE
COCAINE UR QL: NEGATIVE
ETHANOL UR QL SCN: NEGATIVE
OPIATES UR QL SCN: NEGATIVE

## 2018-02-13 PROCEDURE — 99283 EMERGENCY DEPT VISIT LOW MDM: CPT | Mod: Z6 | Performed by: PSYCHIATRY & NEUROLOGY

## 2018-02-13 PROCEDURE — 25000132 ZZH RX MED GY IP 250 OP 250 PS 637: Performed by: EMERGENCY MEDICINE

## 2018-02-13 PROCEDURE — 80307 DRUG TEST PRSMV CHEM ANLYZR: CPT | Performed by: FAMILY MEDICINE

## 2018-02-13 PROCEDURE — 90791 PSYCH DIAGNOSTIC EVALUATION: CPT

## 2018-02-13 PROCEDURE — 99285 EMERGENCY DEPT VISIT HI MDM: CPT | Mod: 25 | Performed by: PSYCHIATRY & NEUROLOGY

## 2018-02-13 PROCEDURE — 80320 DRUG SCREEN QUANTALCOHOLS: CPT | Performed by: FAMILY MEDICINE

## 2018-02-13 RX ORDER — ACETAMINOPHEN 325 MG/1
650 TABLET ORAL ONCE
Status: COMPLETED | OUTPATIENT
Start: 2018-02-13 | End: 2018-02-13

## 2018-02-13 RX ADMIN — ACETAMINOPHEN 650 MG: 325 TABLET, FILM COATED ORAL at 12:22

## 2018-02-13 ASSESSMENT — ENCOUNTER SYMPTOMS
NERVOUS/ANXIOUS: 0
SHORTNESS OF BREATH: 0
FEVER: 0
HALLUCINATIONS: 0
ABDOMINAL PAIN: 0

## 2018-02-13 NOTE — ED AVS SNAPSHOT
[facility], Emergency Department    [address]    [place] [address]    Phone:  [phone]    Fax:  [phone]                                       Galen Jarrett   MRN: [id number]    Department:  [facility], Emergency Department   Date of Visit:  2/13/2018           Patient Information     Date Of Birth          1990        Your diagnoses for this visit were:     Stimulant abuse        You were seen by Genesis Nagy MD and Silvino Cardoso MD.        Discharge Instructions       Strongly encourage you to use the list of CD treatment programs in order to call and get set up with a treatment program    In the meantime go to AA/NA meetings    No use of illicit drugs (including stimulants)      24 Hour Appointment Hotline       To make an appointment at any [facility] clinic, call [phone] ([phone]). If you don't have a family doctor or clinic, we will help you find one. [facility] clinics are conveniently located to serve the needs of you and your family.             Review of your medicines      Our records show that you are taking the medicines listed below. If these are incorrect, please call your family doctor or clinic.        Dose / Directions Last dose taken    ALPRAZolam 0.5 MG tablet   Commonly known as:  XANAX   Dose:  0.5 mg   Quantity:  20 tablet        Take 1 tablet (0.5 mg) by mouth 3 times daily as needed for anxiety   Refills:  0        hydrOXYzine 25 MG tablet   Commonly known as:  ATARAX   Dose:  75 mg   Quantity:  90 tablet        Take 3 tablets (75 mg) by mouth 3 times daily as needed for anxiety   Refills:  0        propranolol 10 MG tablet   Commonly known as:  INDERAL   Dose:  10 mg   Quantity:  60 tablet        Take 1 tablet (10 mg) by mouth 3 times daily as needed (anxiety)   Refills:  0        QUEtiapine 100 MG tablet   Commonly known as:  SEROquel   Dose:  100 mg   Quantity:  60 tablet        Take 1 tablet (100 mg) by mouth 3 times daily as  "needed (Severe anxiety)   Refills:  1        * WELLBUTRIN PO   Dose:  300 mg        Take 300 mg by mouth   Refills:  0        * buPROPion 300 MG 24 hr tablet   Commonly known as:  WELLBUTRIN XL   Dose:  300 mg   Quantity:  30 tablet        Take 1 tablet (300 mg) by mouth every morning Please call [phone] to schedule a depression follow up visit before further refills   Refills:  0        * Notice:  This list has 2 medication(s) that are the same as other medications prescribed for you. Read the directions carefully, and ask your doctor or other care provider to review them with you.            Procedures and tests performed during your visit     Drug abuse screen 6 urine (tox)      Orders Needing Specimen Collection     None      Pending Results     No orders found from 2/11/2018 to 2/14/2018.            Pending Culture Results     No orders found from 2/11/2018 to 2/14/2018.            Pending Results Instructions     If you had any lab results that were not finalized at the time of your Discharge, you can call the ED Lab Result RN at [phone]. You will be contacted by this team for any positive Lab results or changes in treatment. The nurses are available 7 days a week from 10A to 6:30P.  You can leave a message 24 hours per day and they will return your call.        Thank you for choosing [facility]       Thank you for choosing [facility] for your care. Our goal is always to provide you with excellent care. Hearing back from our patients is one way we can continue to improve our services. Please take a few minutes to complete the written survey that you may receive in the mail after you visit with us. Thank you!        [employer] Information     [employer] lets you send messages to your doctor, view your test results, renew your prescriptions, schedule appointments and more. To sign up, go to [email or web address] . Click on \"Log in\" on the left side of the screen, which will take you to the Welcome page. Then " "click on \"Sign up Now\" on the right side of the page.     You will be asked to enter the access code listed below, as well as some personal information. Please follow the directions to create your username and password.     Your access code is: [email or web address]  Expires: 2018  1:00 PM     Your access code will  in 90 days. If you need help or a new code, please call your [facility] or [phone].        Equal Access to Services     [facility]: Hadchris galvezo Sokathrynali, waaxda luqadaha, qaybta kaalmada adedustyyaphilip, carson jett . So [facility] [phone].    ATENCIÓN: Si habla español, tiene a waters disposición servicios gratuitos de asistencia lingüística. Llame al [phone].    We comply with applicable federal civil rights laws and [place] laws. We do not discriminate on the basis of race, color, national origin, age, disability, sex, sexual orientation, or gender identity.            After Visit Summary       This is your record. Keep this with you and show to your community pharmacist(s) and doctor(s) at your next visit.                  "

## 2018-02-13 NOTE — ED NOTES
Patient seen for medical clearance exam prior to transfer to the psychiatric emergency department.  Patient says that he was outside for the last 2-1/2 days.  Patient says that he was trying on the blue line in his feet ended up getting colds.  He says that he was inside for about 3 hours yesterday.  Says that his feet now mildly hard to touch.  Denies any other physical complaint.    Exam:  NAD, calm,  Cooperative  Feet-normal distal capillary refill in all bilateral toes.  Toes are pinkish in color but are warm.  No alteration of skin.  Normal range of motion.  Normal distal pulses in feet.    Patient has mild frostbite of bilateral feet.  Patient has no ulceration or peeling of skin.  No signs of infection.  Currently feet are warm and well perfused.  No acute medical intervention needed at this time.    Patient safe to be transferred to psychiatric ER for further care.    Signed:  Genesis Nagy MD  February 13, 2018 at 12:16 PM       Genesis Nagy MD  02/13/18 2299

## 2018-02-13 NOTE — DISCHARGE INSTRUCTIONS
Strongly encourage you to use the list of CD treatment programs in order to call and get set up with a treatment program    In the meantime go to AA/NA meetings    No use of illicit drugs (including stimulants)

## 2018-02-13 NOTE — ED NOTES
Father vance wanting to give further information regarding son,  MD aware, phone number obtained:  Father;Leonid  # [phone]

## 2018-02-13 NOTE — ED AVS SNAPSHOT
[facility], Emergency Department    [address] [place] [address]    Phone:  [phone]    Fax:  [phone]                                       Galen Jarrett   MRN: [id number]    Department:  [facility], Emergency Department   Date of Visit:  2/13/2018           After Visit Summary Signature Page     I have received my discharge instructions, and my questions have been answered. I have discussed any challenges I see with this plan with the nurse or doctor.    ..........................................................................................................................................  Patient/Patient Representative Signature      ..........................................................................................................................................  Patient Representative Print Name and Relationship to Patient    ..................................................               ................................................  Date                                            Time    ..........................................................................................................................................  Reviewed by Signature/Title    ...................................................              ..............................................  Date                                                            Time

## 2018-02-13 NOTE — ED PROVIDER NOTES
History     Chief Complaint   Patient presents with     Hallucinations     Meth? feet and hands cold? frost bite? released from custody: arrived at home with bizarr behavoire: mom thinks drugs:      The history is provided by the patient, medical records and a parent.     Galen Jarrett is a 27 year old male who comes in due to his mom calling 911 when he showed up at her house today.  This was after he called her to pick him up in [facility].  He was all wet.  Mom was worried he was wandering around for hours and could have frostbite. He was seen by Dr. Nagy and medically cleared.  Please see her note from today for details.  He was rambling earlier when mom was talking to him.  She got him dry clothes and then dropped him off somewhere. He ended up back at mom's house which is why she called 911.  He never mentioned being suicidal or homicidal. He denies any hallucinations. He is tracking well.  He gave water for his urine sample. He still minimizes any drug use.  He was admitted a week ago and stayed for 2 days as that was how long it took him to clear from his drug use.      Please see the 's assessment in EPIC from today for further details.    I have reviewed the Medications, Allergies, Past Medical and Surgical History, and Social History in the Epic system.    Review of Systems   Constitutional: Negative for fever.   Respiratory: Negative for shortness of breath.    Cardiovascular: Negative for chest pain.   Gastrointestinal: Negative for abdominal pain.   Psychiatric/Behavioral: Positive for behavioral problems. Negative for hallucinations, self-injury and suicidal ideas. The patient is not nervous/anxious.    All other systems reviewed and are negative.      Physical Exam   BP: 107/44  Pulse: 88  Temp: 98.3  F (36.8  C)  Resp: 16  SpO2: 98 %      Physical Exam   Constitutional: He is oriented to person, place, and time. He appears well-developed and well-nourished.   HENT:   Head:  Normocephalic and atraumatic.   Mouth/Throat: Oropharynx is clear and moist. No oropharyngeal exudate.   Eyes: Pupils are equal, round, and reactive to light.   Neck: Normal range of motion. Neck supple.   Cardiovascular: Normal rate, regular rhythm and normal heart sounds.    Pulmonary/Chest: Effort normal and breath sounds normal. No respiratory distress.   Abdominal: Soft. Bowel sounds are normal. There is no tenderness.   Musculoskeletal: Normal range of motion.   Neurological: He is alert and oriented to person, place, and time.   Skin: Skin is warm. No rash noted.   Psychiatric: He has a normal mood and affect. His speech is normal and behavior is normal. Thought content normal. He is not actively hallucinating. Thought content is not paranoid and not delusional. Cognition and memory are normal. He expresses inappropriate judgment. He expresses no homicidal and no suicidal ideation. He expresses no suicidal plans and no homicidal plans.   Galen is a 28 y/o male who looks his age. He is slightly disheveled with good eye contact.   Nursing note and vitals reviewed.      ED Course     ED Course     Procedures            Labs Ordered and Resulted from Time of ED Arrival Up to the Time of Departure from the ED   DRUG ABUSE SCREEN 6 CHEM DEP URINE ([facility])            Assessments & Plan (with Medical Decision Making)   Galen will be discharged.  He is not an imminent risk to himself or others.  He was offered several levels of care including help getting CD treatment set up, going to a [facility] detox or even hospitalization (although he was light meeting criteria).  He declined all those and wanted to follow up with AA/NA.  He did take a list of CD treatment program.  This seems to be all drug related and he does not meet criteria for a 72 hour hold due to his lack of imminent risk and ability to track currently. He does have a long range risk due to his continued drug use.    I have reviewed the nursing notes.    I  have reviewed the findings, diagnosis, plan and need for follow up with the patient.    New Prescriptions    No medications on file       Final diagnoses:   Stimulant abuse       2/13/2018   [facility], [place], EMERGENCY DEPARTMENT     Silvino Cardoso MD  02/13/18 5954

## 2018-02-23 ENCOUNTER — HOSPITAL ENCOUNTER (EMERGENCY)
Facility: CLINIC | Age: 28
Discharge: OTHER INSTITUTION | End: 2018-02-23
Attending: EMERGENCY MEDICINE | Admitting: EMERGENCY MEDICINE
Payer: COMMERCIAL

## 2018-02-23 VITALS
BODY MASS INDEX: 23.54 KG/M2 | HEART RATE: 62 BPM | OXYGEN SATURATION: 99 % | RESPIRATION RATE: 16 BRPM | HEIGHT: 67 IN | TEMPERATURE: 97.5 F | WEIGHT: 150 LBS | SYSTOLIC BLOOD PRESSURE: 124 MMHG | DIASTOLIC BLOOD PRESSURE: 76 MMHG

## 2018-02-23 DIAGNOSIS — Z59.00 HOMELESSNESS: ICD-10-CM

## 2018-02-23 DIAGNOSIS — F15.10 STIMULANT ABUSE (H): ICD-10-CM

## 2018-02-23 PROCEDURE — 99285 EMERGENCY DEPT VISIT HI MDM: CPT

## 2018-02-23 RX ORDER — QUETIAPINE FUMARATE 100 MG/1
100 TABLET, FILM COATED ORAL 3 TIMES DAILY PRN
Qty: 9 TABLET | Refills: 1 | Status: SHIPPED | OUTPATIENT
Start: 2018-02-23 | End: 2022-11-22

## 2018-02-23 RX ORDER — HYDROXYZINE HYDROCHLORIDE 25 MG/1
75 TABLET, FILM COATED ORAL 3 TIMES DAILY PRN
Qty: 9 TABLET | Refills: 1 | Status: SHIPPED | OUTPATIENT
Start: 2018-02-23 | End: 2018-02-26

## 2018-02-23 RX ORDER — BUPROPION HYDROCHLORIDE 300 MG/1
300 TABLET ORAL EVERY MORNING
Qty: 3 TABLET | Refills: 0 | Status: SHIPPED | OUTPATIENT
Start: 2018-02-23 | End: 2022-11-22

## 2018-02-23 SDOH — ECONOMIC STABILITY - HOUSING INSECURITY: HOMELESSNESS UNSPECIFIED: Z59.00

## 2018-02-23 ASSESSMENT — ENCOUNTER SYMPTOMS
ABDOMINAL PAIN: 0
DECREASED CONCENTRATION: 1

## 2018-02-23 NOTE — ED NOTES
Bed: ED14  Expected date:   Expected time:   Means of arrival:   Comments:  [facility] - [phone] - 26 psych eval eta 4013

## 2018-02-23 NOTE — ED PROVIDER NOTES
"  History     Chief Complaint:  Psychiatric Evaluation    HPI   The patient is drowsy and mumbling on initial exam. The history is limited and therefore much of the history is provided by EMS.    Galen Jarrett is a 27 year old male with a history of meth use and chemical dependence who presents after calling EMS for a psychiatric evaluation. EMS reports that they were flagged down in a parking ramp by the patient. En route, he appeared delusional and was complaining of frost bite. The patient had no evidence of frost bite, though he was found with only one boot. EMS reports that he was not making sense but stated that he had not used drugs in a week and that someone poisoned him with rat poison. He was hemodynamically stable en route. The patient reports that he is unsure when he last used and that the only way he could be high is if someone slipped something in his mouth while sleeping. He now states that he is unsure why he called EMS but denies medical complaint, pain, or abdominal pain. The patient states that he \"is not living anywhere right now\" but is not homeless.    Allergies:  Amoxicillin  Sulfa Drugs  Zyprexa [Olanzapine]  Erythromycin      Medications:    Xanax  Wellbutrin  Seroquel  Atarax  Inderal     Past Medical History:    Anxiety  Asthma   Chemical Dependency  Depression  Psychosis    Past Surgical History:    Esophagoscopy, gastroscopy, duodenoscopy     Family History:    GI disease - paternal grandmother     Social History:  Smoking status: no  Alcohol use: no  Drug use: yes   PCP: No Ref-Primary, Physician   Patient presents via EMS.   Marital Status:  Single      Review of Systems   Gastrointestinal: Negative for abdominal pain.   Psychiatric/Behavioral: Positive for decreased concentration.   All other systems reviewed and are negative.    Physical Exam     Patient Vitals for the past 24 hrs:   BP Temp Heart Rate Resp SpO2 Height Weight   02/23/18 1011 128/82 97.5  F (36.4  C) 59 16 99 % " "1.702 m (5' 7\") 68 kg (150 lb)     Physical Exam  Eye:  Pupils are equal, round, and reactive.  Extraocular movements intact.    ENT:  No rhinorrhea.  Moist mucus membranes.  Normal tongue and tonsil.    Cardiac:  Regular rate and rhythm.  No murmurs, gallops, or rubs.    Pulmonary:  Clear to auscultation bilaterally.  No wheezes, rales, or rhonchi.    Abdomen:  Positive bowel sounds.  Abdomen is soft and non-distended, without focal tenderness.    Musculoskeletal:  Normal movement of all extremities without evidence for deficit.    Skin:  Warm and dry without rashes.    Neurologic:  Non-focal exam without asymmetric weakness or numbness.     Psychiatric:  Patient is clearly under the influence of a stimulant, at times verbose and at others falling asleep. He is disheveled and denies having residence. Patient is non suicidal.    Emergency Department Course     Emergency Department Course:  Past medical records, nursing notes, and vitals reviewed.  1058: I performed an exam of the patient and obtained history, as documented above. GCS 15.  1156: Findings and plan explained to the Patient. Patient discharged home with instructions regarding supportive care, medications, and reasons to return. The importance of close follow-up was reviewed.      Impression & Plan      Medical Decision Making:  This unfortunate young man well known to this emergency department for methamphetamine abuse presents to us with a similar clinical picture.  The patient cannot explain why he called 911, but was found to be sitting on a heater outside of the building in the cold with one boot on.  He specifically requested to come to [facility] because his mother lives [place].  However, perusing his notes, his mother will not care for him when he is intoxicated like this.    On my exam, the patient is initially very tired.  However, he then awakens with verbal stimuli and has some tangential thought.  He cannot exactly explain why he " called 911 or came to the emergency department today.  He states that he cannot call any sober person for a ride.  He denies any medical concerns and his physical exam and vital signs are normal.  With this, considering this is a very similar presentation as prior when he has had positive amphetamines in his urine, I do feel this is all related to stimulant abuse along with underlying untreated mental health pathology.  I see no medical reason to admit him to the hospital and he has refused chemical dependency help in the past.  He will be transferred to [address] [place] [address] for detox.  3 days of his medications were provided.  They are aware of him at this detox facility, caring for him multiple times in the past.    Diagnosis:    ICD-10-CM    1. Stimulant abuse F15.10    2. Homelessness Z59.0        Disposition:  discharged to detox    Discharge Medications:  New Prescriptions    BUPROPION (WELLBUTRIN XL) 300 MG 24 HR TABLET    Take 1 tablet (300 mg) by mouth every morning for 3 days    HYDROXYZINE (ATARAX) 25 MG TABLET    Take 3 tablets (75 mg) by mouth 3 times daily as needed for anxiety    QUETIAPINE (SEROQUEL) 100 MG TABLET    Take 1 tablet (100 mg) by mouth 3 times daily as needed         Chani Locke  2/23/2018    EMERGENCY DEPARTMENT   I, Chani Locke, am serving as a scribe at 10:58 AM on 2/23/2018 to document services personally performed by Trierweiler, Chad A, MD based on my observations and the provider's statements to me.        Trierweiler, Chad A, MD  02/23/18 2496

## 2018-02-23 NOTE — DISCHARGE INSTRUCTIONS
Drug Abuse  Use and abuse of drugs like marijuana, amphetamines (speed, crank), cocaine, heroin or prescription pain medicines, sedatives and sleeping pills, MDMA, ecstasy, bath salts, PCP, mescaline and LSD may lead to addiction or dependence. Once this happens, you are at greater risk for any of the following:  Social and personal problems    Craving for the drug and not able to stop using even though you think you want to stop (psychological addiction)    Drug withdrawal symptoms if you stop taking the drug (physical dependence)    Loss of job or your family    Arrest, conviction, and [facility] sentence for possession of an illegal substance or for driving under the influence  Health problems    Strokes, heart attacks, kidney failure    Accidental injuries to yourself or others while you are under the influence of the drug (in a car or at home)    HIV infection (much greater risk if you use IV drugs)    Skin infections    Other sexually transmitted disease (STDs such as herpes, chlamydia, gonorrhea, and others)    Severe and fatal infection of the heart valves (if you use IV drugs)    Hepatitis B or C    Death from overdose  Home care  The following suggestions can help you care for yourself at home:    Admit you have a drug problem. Ask for help from your family and close friends.    Seek professional help. This could be in the form of individual psychotherapy or counseling. There are also outpatient, inpatient, and residential drug treatment programs.    Join a self-help group for drug abuse.    Stay away from friends who abuse drugs or tempt you to continue abusing drugs.    Eat a balanced diet and start a regular exercise program.  Follow-up care  Follow up with your healthcare provider, or as advised. Contact one of the resources below for help:    National [place] on Alcoholism and Drug Dependence  www.ncadd.org  541.237.9209    Narcotics Anonymous  www.na.org  268.477.1309    National Alcohol and Substance  Abuse Information Center (for referral to treatment programs)  www.addictioncareRTB-Media.[employer]  656.548.4683  Call 911  Call 911 if any of the following occur:    Seizure    Hard time breathing or slow, irregular breathing    Chest pain    Sudden weakness on one side of your body or sudden trouble speaking    Very drowsy or trouble awakening    Fainting or loss of consciousness    Rapid heart rate    Very slow heart rate  When to seek medical advice  Call your healthcare provider right away if any of these occur:    Agitation, anxiety, unable to sleep    Unintended weight loss (more than 10 to 15 pounds over 3 months)    Fever of 100.4 F (38 C) or higher, or as directed by your healthcare provider    Shortness of breath    Cough with colored sputum    Redness, swelling, or tenderness at an injection site  Date Last Reviewed: 6/1/2016 2000-2017 [employer]. [address] [place] [address]. All rights reserved. This information is not intended as a substitute for professional medical care. Always follow your healthcare professional's instructions.

## 2018-02-23 NOTE — ED AVS SNAPSHOT
Emergency Department    [address] [facility] [address]    Phone:  [phone]    Fax:  [phone]                                       Galen Jarrett   MRN: [id number]    Department:   Emergency Department   Date of Visit:  2/23/2018           After Visit Summary Signature Page     I have received my discharge instructions, and my questions have been answered. I have discussed any challenges I see with this plan with the nurse or doctor.    ..........................................................................................................................................  Patient/Patient Representative Signature      ..........................................................................................................................................  Patient Representative Print Name and Relationship to Patient    ..................................................               ................................................  Date                                            Time    ..........................................................................................................................................  Reviewed by Signature/Title    ...................................................              ..............................................  Date                                                            Time

## 2018-02-23 NOTE — ED AVS SNAPSHOT
Emergency Department    [address] [facility] [address]    Phone:  [phone]    Fax:  [phone]                                       Galen Jarrett   MRN: [id number]    Department:   Emergency Department   Date of Visit:  2/23/2018           Patient Information     Date Of Birth          1990        Your diagnoses for this visit were:     Stimulant abuse     Homelessness        You were seen by Trierweiler, Chad A, MD.      Follow-up Information     Follow up with  Emergency Department.    Specialty:  EMERGENCY MEDICINE    Why:  If symptoms worsen    Contact information:    [address] [facility] [address] [phone]        Discharge Instructions         Drug Abuse  Use and abuse of drugs like marijuana, amphetamines (speed, crank), cocaine, heroin or prescription pain medicines, sedatives and sleeping pills, MDMA, ecstasy, bath salts, PCP, mescaline and LSD may lead to addiction or dependence. Once this happens, you are at greater risk for any of the following:  Social and personal problems    Craving for the drug and not able to stop using even though you think you want to stop (psychological addiction)    Drug withdrawal symptoms if you stop taking the drug (physical dependence)    Loss of job or your family    Arrest, conviction, and senior living sentence for possession of an illegal substance or for driving under the influence  Health problems    Strokes, heart attacks, kidney failure    Accidental injuries to yourself or others while you are under the influence of the drug (in a car or at home)    HIV infection (much greater risk if you use IV drugs)    Skin infections    Other sexually transmitted disease (STDs such as herpes, chlamydia, gonorrhea, and others)    Severe and fatal infection of the heart valves (if you use IV drugs)    Hepatitis B or C    Death from overdose  Home care  The following suggestions can help you care for yourself at  home:    Admit you have a drug problem. Ask for help from your family and close friends.    Seek professional help. This could be in the form of individual psychotherapy or counseling. There are also outpatient, inpatient, and residential drug treatment programs.    Join a self-help group for drug abuse.    Stay away from friends who abuse drugs or tempt you to continue abusing drugs.    Eat a balanced diet and start a regular exercise program.  Follow-up care  Follow up with your healthcare provider, or as advised. Contact one of the resources below for help:    National [place] on Alcoholism and Drug Dependence  www.ncadd.org  296.624.2930    Narcotics Anonymous  www.na.org  377.174.2183    National Alcohol and Substance Abuse Information Center (for referral to treatment programs)  www.[employer]  174.293.6832  Call 911  Call 911 if any of the following occur:    Seizure    Hard time breathing or slow, irregular breathing    Chest pain    Sudden weakness on one side of your body or sudden trouble speaking    Very drowsy or trouble awakening    Fainting or loss of consciousness    Rapid heart rate    Very slow heart rate  When to seek medical advice  Call your healthcare provider right away if any of these occur:    Agitation, anxiety, unable to sleep    Unintended weight loss (more than 10 to 15 pounds over 3 months)    Fever of 100.4 F (38 C) or higher, or as directed by your healthcare provider    Shortness of breath    Cough with colored sputum    Redness, swelling, or tenderness at an injection site  Date Last Reviewed: 6/1/2016 2000-2017 [employer]. [address] [place] [address]. All rights reserved. This information is not intended as a substitute for professional medical care. Always follow your healthcare professional's instructions.          24 Hour Appointment Hotline       To make an appointment at any [facility], call [phone] ([phone]). If  you don't have a family doctor or clinic, we will help you find one. [facility] clinics are conveniently located to serve the needs of you and your family.             Review of your medicines      CONTINUE these medicines which may have CHANGED, or have new prescriptions. If we are uncertain of the size of tablets/capsules you have at home, strength may be listed as something that might have changed.        Dose / Directions Last dose taken    buPROPion 300 MG 24 hr tablet   Commonly known as:  WELLBUTRIN XL   Dose:  300 mg   What changed:    - additional instructions  - Another medication with the same name was removed. Continue taking this medication, and follow the directions you see here.   Quantity:  3 tablet        Take 1 tablet (300 mg) by mouth every morning for 3 days   Refills:  0        QUEtiapine 100 MG tablet   Commonly known as:  SEROquel   Dose:  100 mg   What changed:  reasons to take this   Quantity:  9 tablet        Take 1 tablet (100 mg) by mouth 3 times daily as needed   Refills:  1          Our records show that you are taking the medicines listed below. If these are incorrect, please call your family doctor or clinic.        Dose / Directions Last dose taken    hydrOXYzine 25 MG tablet   Commonly known as:  ATARAX   Dose:  75 mg   Quantity:  9 tablet        Take 3 tablets (75 mg) by mouth 3 times daily as needed for anxiety   Refills:  1          STOP taking        Dose Reason for stopping Comments    ALPRAZolam 0.5 MG tablet   Commonly known as:  XANAX              propranolol 10 MG tablet   Commonly known as:  INDERAL                      Prescriptions were sent or printed at these locations (3 Prescriptions)                   Other Prescriptions                Printed at Department/Unit printer (3 of 3)         buPROPion (WELLBUTRIN XL) 300 MG 24 hr tablet               QUEtiapine (SEROQUEL) 100 MG tablet               hydrOXYzine (ATARAX) 25 MG tablet                Orders Needing Specimen  Collection     Ordered          02/23/18 1043  Drug abuse screen urine - STAT, [place]: STAT, Needs to be Collected     Scheduled Task Status   02/23/18 1044 Collect Drug abuse screen urine Open   Order Class:  PCU Collect                  Pending Results     No orders found from 2/21/2018 to 2/24/2018.            Pending Culture Results     No orders found from 2/21/2018 to 2/24/2018.            Pending Results Instructions     If you had any lab results that were not finalized at the time of your Discharge, you can call the ED Lab Result RN at [phone]. You will be contacted by this team for any positive Lab results or changes in treatment. The nurses are available 7 days a week from 10A to 6:30P.  You can leave a message 24 hours per day and they will return your call.        Test Results From Your Hospital Stay               Clinical Quality Measure: Blood Pressure Screening     Your blood pressure was checked while you were in the emergency department today. The last reading we obtained was  BP: 128/82 . Please read the guidelines below about what these numbers mean and what you should do about them.  If your systolic blood pressure (the top number) is less than 120 and your diastolic blood pressure (the bottom number) is less than 80, then your blood pressure is normal. There is nothing more that you need to do about it.  If your systolic blood pressure (the top number) is 120-139 or your diastolic blood pressure (the bottom number) is 80-89, your blood pressure may be higher than it should be. You should have your blood pressure rechecked within a year by a primary care provider.  If your systolic blood pressure (the top number) is 140 or greater or your diastolic blood pressure (the bottom number) is 90 or greater, you may have high blood pressure. High blood pressure is treatable, but if left untreated over time it can put you at risk for heart attack, stroke, or kidney failure. You should have your blood  "pressure rechecked by a primary care provider within the next 4 weeks.  If your provider in the emergency department today gave you specific instructions to follow-up with your doctor or provider even sooner than that, you should follow that instruction and not wait for up to 4 weeks for your follow-up visit.        Thank you for choosing [facility]       Thank you for choosing [facility] for your care. Our goal is always to provide you with excellent care. Hearing back from our patients is one way we can continue to improve our services. Please take a few minutes to complete the written survey that you may receive in the mail after you visit with us. Thank you!        [employer] Information     [employer] lets you send messages to your doctor, view your test results, renew your prescriptions, schedule appointments and more. To sign up, go to [email or web address] . Click on \"Log in\" on the left side of the screen, which will take you to the Welcome page. Then click on \"Sign up Now\" on the right side of the page.     You will be asked to enter the access code listed below, as well as some personal information. Please follow the directions to create your username and password.     Your access code is: [email or web address]  Expires: 2018  1:00 PM     Your access code will  in 90 days. If you need help or a new code, please call your [facility] clinic or [phone].        Equal Access to Services     WALTER VELARDE : Hadii bibi Valenzuela, wachelda sandee, qaybta kaalcarson mancini. So [facility] [phone].    ATENCIÓN: Si habla español, tiene a waters disposición servicios gratuitos de asistencia lingüística. Zaria al [phone].    We comply with applicable federal civil rights laws and [place] laws. We do not discriminate on the basis of race, color, national origin, age, disability, sex, sexual orientation, or gender identity.            After Visit Summary       This is " your record. Keep this with you and show to your community pharmacist(s) and doctor(s) at your next visit.

## 2021-05-04 ENCOUNTER — HOSPITAL ENCOUNTER (EMERGENCY)
Facility: CLINIC | Age: 31
Discharge: HOME | End: 2021-05-04
Attending: EMERGENCY MEDICINE | Admitting: EMERGENCY MEDICINE
Payer: COMMERCIAL

## 2021-05-04 VITALS
BODY MASS INDEX: 22.9 KG/M2 | OXYGEN SATURATION: 99 % | HEART RATE: 90 BPM | RESPIRATION RATE: 16 BRPM | WEIGHT: 160 LBS | TEMPERATURE: 97.6 F | HEIGHT: 70 IN | DIASTOLIC BLOOD PRESSURE: 68 MMHG | SYSTOLIC BLOOD PRESSURE: 111 MMHG

## 2021-05-04 DIAGNOSIS — F19.11 HISTORY OF DRUG ABUSE (H): ICD-10-CM

## 2021-05-04 DIAGNOSIS — G92.9 TOXIC ENCEPHALOPATHY: ICD-10-CM

## 2021-05-04 LAB
ALBUMIN SERPL-MCNC: 4.1 G/DL (ref 3.4–5)
ALP SERPL-CCNC: 86 U/L (ref 40–150)
ALT SERPL W P-5'-P-CCNC: 18 U/L (ref 0–70)
ANION GAP SERPL CALCULATED.3IONS-SCNC: 5 MMOL/L (ref 3–14)
APAP SERPL-MCNC: <2 MG/L (ref 10–20)
AST SERPL W P-5'-P-CCNC: 8 U/L (ref 0–45)
BASOPHILS # BLD AUTO: 0.1 10E9/L (ref 0–0.2)
BASOPHILS NFR BLD AUTO: 1.4 %
BILIRUB SERPL-MCNC: 0.8 MG/DL (ref 0.2–1.3)
BUN SERPL-MCNC: 12 MG/DL (ref 7–30)
CALCIUM SERPL-MCNC: 9.5 MG/DL (ref 8.5–10.1)
CHLORIDE SERPL-SCNC: 106 MMOL/L (ref 94–109)
CO2 SERPL-SCNC: 27 MMOL/L (ref 20–32)
CREAT SERPL-MCNC: 0.89 MG/DL (ref 0.66–1.25)
DIFFERENTIAL METHOD BLD: ABNORMAL
EOSINOPHIL # BLD AUTO: 0.2 10E9/L (ref 0–0.7)
EOSINOPHIL NFR BLD AUTO: 2 %
ERYTHROCYTE [DISTWIDTH] IN BLOOD BY AUTOMATED COUNT: 12.4 % (ref 10–15)
ETHANOL SERPL-MCNC: <0.01 G/DL
GFR SERPL CREATININE-BSD FRML MDRD: >90 ML/MIN/{1.73_M2}
GLUCOSE SERPL-MCNC: 126 MG/DL (ref 70–99)
HCT VFR BLD AUTO: 44.4 % (ref 40–53)
HGB BLD-MCNC: 14.7 G/DL (ref 13.3–17.7)
IMM GRANULOCYTES # BLD: 0 10E9/L (ref 0–0.4)
IMM GRANULOCYTES NFR BLD: 0.2 %
INTERPRETATION ECG - MUSE: NORMAL
LYMPHOCYTES # BLD AUTO: 1.8 10E9/L (ref 0.8–5.3)
LYMPHOCYTES NFR BLD AUTO: 22.8 %
MCH RBC QN AUTO: 28.6 PG (ref 26.5–33)
MCHC RBC AUTO-ENTMCNC: 33.1 G/DL (ref 31.5–36.5)
MCV RBC AUTO: 86 FL (ref 78–100)
MONOCYTES # BLD AUTO: 0.7 10E9/L (ref 0–1.3)
MONOCYTES NFR BLD AUTO: 8.8 %
NEUTROPHILS # BLD AUTO: 5.2 10E9/L (ref 1.6–8.3)
NEUTROPHILS NFR BLD AUTO: 64.8 %
NRBC # BLD AUTO: 0 10*3/UL
NRBC BLD AUTO-RTO: 0 /100
PLATELET # BLD AUTO: 478 10E9/L (ref 150–450)
POTASSIUM SERPL-SCNC: 3.4 MMOL/L (ref 3.4–5.3)
PROT SERPL-MCNC: 7.8 G/DL (ref 6.8–8.8)
RBC # BLD AUTO: 5.14 10E12/L (ref 4.4–5.9)
SALICYLATES SERPL-MCNC: <2 MG/DL
SODIUM SERPL-SCNC: 138 MMOL/L (ref 133–144)
WBC # BLD AUTO: 8 10E9/L (ref 4–11)

## 2021-05-04 PROCEDURE — 82077 ASSAY SPEC XCP UR&BREATH IA: CPT | Performed by: EMERGENCY MEDICINE

## 2021-05-04 PROCEDURE — 80179 DRUG ASSAY SALICYLATE: CPT | Performed by: EMERGENCY MEDICINE

## 2021-05-04 PROCEDURE — 99285 EMERGENCY DEPT VISIT HI MDM: CPT

## 2021-05-04 PROCEDURE — 80143 DRUG ASSAY ACETAMINOPHEN: CPT | Performed by: EMERGENCY MEDICINE

## 2021-05-04 PROCEDURE — 85025 COMPLETE CBC W/AUTO DIFF WBC: CPT | Performed by: EMERGENCY MEDICINE

## 2021-05-04 PROCEDURE — 93005 ELECTROCARDIOGRAM TRACING: CPT

## 2021-05-04 PROCEDURE — 80053 COMPREHEN METABOLIC PANEL: CPT | Performed by: EMERGENCY MEDICINE

## 2021-05-04 ASSESSMENT — MIFFLIN-ST. JEOR: SCORE: 1697.01

## 2021-05-04 NOTE — ED TRIAGE NOTES
"Patient brought in for a legal draw. PD stated patient had become increasingly altered in his custody and possibly ingested meth and heroin. Patient states \"I think someone gave me fentanyl.\" Denies SI.   "
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
## 2021-05-04 NOTE — ED NOTES
Bed: [facility]  Expected date:   Expected time:   Means of arrival:   Comments:  Etoh now

## 2022-01-15 ENCOUNTER — HEALTH MAINTENANCE LETTER (OUTPATIENT)
Age: 32
End: 2022-01-15

## 2022-01-31 ENCOUNTER — HOSPITAL ENCOUNTER (EMERGENCY)
Facility: CLINIC | Age: 32
Discharge: HOME | End: 2022-01-31
Attending: PSYCHIATRY & NEUROLOGY | Admitting: PSYCHIATRY & NEUROLOGY
Payer: COMMERCIAL

## 2022-01-31 VITALS
HEART RATE: 80 BPM | OXYGEN SATURATION: 96 % | RESPIRATION RATE: 12 BRPM | TEMPERATURE: 98.3 F | BODY MASS INDEX: 22.96 KG/M2 | DIASTOLIC BLOOD PRESSURE: 81 MMHG | SYSTOLIC BLOOD PRESSURE: 127 MMHG | WEIGHT: 160 LBS

## 2022-01-31 DIAGNOSIS — Z59.00 HOMELESS: ICD-10-CM

## 2022-01-31 DIAGNOSIS — Z76.5 MALINGERING: ICD-10-CM

## 2022-01-31 DIAGNOSIS — F19.10 SUBSTANCE ABUSE (H): ICD-10-CM

## 2022-01-31 PROCEDURE — 99284 EMERGENCY DEPT VISIT MOD MDM: CPT | Performed by: PSYCHIATRY & NEUROLOGY

## 2022-01-31 PROCEDURE — 99285 EMERGENCY DEPT VISIT HI MDM: CPT | Mod: 25 | Performed by: PSYCHIATRY & NEUROLOGY

## 2022-01-31 PROCEDURE — 250N000013 HC RX MED GY IP 250 OP 250 PS 637: Performed by: PSYCHIATRY & NEUROLOGY

## 2022-01-31 PROCEDURE — 90791 PSYCH DIAGNOSTIC EVALUATION: CPT

## 2022-01-31 RX ORDER — RISPERIDONE 2 MG/1
2 TABLET, ORALLY DISINTEGRATING ORAL ONCE
Status: COMPLETED | OUTPATIENT
Start: 2022-01-31 | End: 2022-01-31

## 2022-01-31 RX ORDER — SERTRALINE HYDROCHLORIDE 100 MG/1
200 TABLET, FILM COATED ORAL DAILY
COMMUNITY
Start: 2021-06-07 | End: 2022-11-22

## 2022-01-31 RX ADMIN — RISPERIDONE 2 MG: 2 TABLET, ORALLY DISINTEGRATING ORAL at 13:08

## 2022-01-31 SDOH — ECONOMIC STABILITY - HOUSING INSECURITY: HOMELESSNESS UNSPECIFIED: Z59.00

## 2022-01-31 ASSESSMENT — ENCOUNTER SYMPTOMS
GASTROINTESTINAL NEGATIVE: 1
HYPERACTIVE: 0
MUSCULOSKELETAL NEGATIVE: 1
CONSTITUTIONAL NEGATIVE: 1
DECREASED CONCENTRATION: 1
RESPIRATORY NEGATIVE: 1
NEUROLOGICAL NEGATIVE: 1
SLEEP DISTURBANCE: 1
CARDIOVASCULAR NEGATIVE: 1
EYES NEGATIVE: 1
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
## 2022-11-22 ENCOUNTER — HOSPITAL ENCOUNTER (EMERGENCY)
Facility: OTHER | Age: 32
Discharge: LEFT BEFORE DISCHARGE | End: 2022-11-22
Attending: FAMILY MEDICINE | Admitting: FAMILY MEDICINE
Payer: COMMERCIAL

## 2022-11-22 VITALS
TEMPERATURE: 98 F | WEIGHT: 160 LBS | HEART RATE: 96 BPM | OXYGEN SATURATION: 98 % | HEIGHT: 70 IN | RESPIRATION RATE: 20 BRPM | BODY MASS INDEX: 22.9 KG/M2 | SYSTOLIC BLOOD PRESSURE: 144 MMHG | DIASTOLIC BLOOD PRESSURE: 88 MMHG

## 2022-11-22 DIAGNOSIS — F91.9 BEHAVIOR DISTURBANCE: ICD-10-CM

## 2022-11-22 PROCEDURE — 99284 EMERGENCY DEPT VISIT MOD MDM: CPT | Performed by: FAMILY MEDICINE

## 2022-11-22 PROCEDURE — 99285 EMERGENCY DEPT VISIT HI MDM: CPT | Mod: 25 | Performed by: FAMILY MEDICINE

## 2022-11-22 RX ORDER — DEXTROAMPHETAMINE/AMPHETAMINE 10 MG
CAPSULE, EXT RELEASE 24 HR ORAL
COMMUNITY
Start: 2022-02-11 | End: 2022-11-22

## 2022-11-22 RX ORDER — LISDEXAMFETAMINE DIMESYLATE 50 MG/1
1 CAPSULE ORAL DAILY
COMMUNITY
Start: 2021-06-07 | End: 2022-11-22

## 2022-11-22 RX ORDER — IBUPROFEN 600 MG/1
TABLET, FILM COATED ORAL
COMMUNITY
Start: 2022-04-28 | End: 2022-11-22

## 2022-11-22 RX ORDER — QUETIAPINE FUMARATE 50 MG/1
50-100 TABLET, FILM COATED ORAL 2 TIMES DAILY PRN
COMMUNITY
End: 2022-11-22

## 2022-11-22 RX ORDER — HYDROXYZINE HYDROCHLORIDE 10 MG/1
10 TABLET, FILM COATED ORAL
COMMUNITY
Start: 2021-02-24 | End: 2022-11-22

## 2022-11-22 RX ORDER — SERTRALINE HYDROCHLORIDE 100 MG/1
200 TABLET, FILM COATED ORAL DAILY
COMMUNITY
Start: 2022-11-15

## 2022-11-22 RX ORDER — QUETIAPINE FUMARATE 100 MG/1
100 TABLET, FILM COATED ORAL AT BEDTIME
COMMUNITY

## 2022-11-22 RX ORDER — POLYETHYLENE GLYCOL 3350 17 G
2 POWDER IN PACKET (EA) ORAL
COMMUNITY
End: 2023-04-13

## 2022-11-22 RX ORDER — DEXTROAMPHETAMINE SULFATE, DEXTROAMPHETAMINE SACCHARATE, AMPHETAMINE SULFATE AND AMPHETAMINE ASPARTATE 7.5; 7.5; 7.5; 7.5 MG/1; MG/1; MG/1; MG/1
CAPSULE, EXTENDED RELEASE ORAL
COMMUNITY
Start: 2022-09-16

## 2022-11-22 RX ORDER — BUPROPION HYDROCHLORIDE 150 MG/1
150 TABLET ORAL EVERY MORNING
COMMUNITY
Start: 2022-11-15 | End: 2023-04-13

## 2022-11-22 RX ORDER — DEXTROAMPHETAMINE SULFATE, DEXTROAMPHETAMINE SACCHARATE, AMPHETAMINE SULFATE AND AMPHETAMINE ASPARTATE 5; 5; 5; 5 MG/1; MG/1; MG/1; MG/1
CAPSULE, EXTENDED RELEASE ORAL
COMMUNITY
Start: 2022-09-16

## 2022-11-22 ASSESSMENT — ACTIVITIES OF DAILY LIVING (ADL)
ADLS_ACUITY_SCORE: 35

## 2022-11-22 ASSESSMENT — ENCOUNTER SYMPTOMS
HEADACHES: 0
NECK STIFFNESS: 0
COLOR CHANGE: 0
CONFUSION: 0
EYE REDNESS: 0
ARTHRALGIAS: 0
FEVER: 0
ABDOMINAL PAIN: 0
SHORTNESS OF BREATH: 0
DIFFICULTY URINATING: 0
AGITATION: 1

## 2022-11-22 ASSESSMENT — COLUMBIA-SUICIDE SEVERITY RATING SCALE - C-SSRS
TOTAL  NUMBER OF INTERRUPTED ATTEMPTS LIFETIME: NO
ATTEMPT LIFETIME: NO
1. HAVE YOU WISHED YOU WERE DEAD OR WISHED YOU COULD GO TO SLEEP AND NOT WAKE UP?: NO
6. HAVE YOU EVER DONE ANYTHING, STARTED TO DO ANYTHING, OR PREPARED TO DO ANYTHING TO END YOUR LIFE?: NO
TOTAL  NUMBER OF ABORTED OR SELF INTERRUPTED ATTEMPTS LIFETIME: NO
2. HAVE YOU ACTUALLY HAD ANY THOUGHTS OF KILLING YOURSELF?: NO
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
## 2022-11-22 NOTE — ED NOTES
"Pt refusing to provide urine sample, fills urinal with water.  Explained that urine sample needed in order to get him into a safe place to stay.  Pt states he does not want a place to stay, he wants to leave her and leave this town.  States \"I dont want a fucking retard house where they fuck around and do the same shit you fuckers do. Get me my shit.\"  "

## 2022-11-22 NOTE — PROGRESS NOTES
Patient noted to be holding a bottle of pills, removed from patient's room and placed in locker with the rest of patient's belongings.

## 2022-11-22 NOTE — ED NOTES
Pt verbally aggressive with sitter and security.  Redirected by nursing.  Pt continues to curse and speak loudly and states wants to leave.  Informed pt that seeking placement in
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
## 2022-11-22 NOTE — PHARMACY-ADMISSION MEDICATION HISTORY
Pharmacy -- ED Medication Reconciliation    Prior to admission (PTA) medications were reviewed and the patient's PTA medication list was updated.    Sources Consulted: chart review, sure scripts    The reliability of this Medication Reconciliation is: Reliability: Borderline reliable    The following significant changes were made:    Removed:    Adderall 10 mg dose     Bupropion 300 mg dose     Hydroxyzine    Ibuprofen 600 mg dose     Vyvanse    Narcan      quetiapine     Sertraline duplicate    Added:    Nicotine lozenge    Updated:    Bupropion dose directions     Nicotine gum dose directions    Note:     adderall has not been filled since 2022 for a 30 day supply      Suzanne Nolan JESIKA, 2022,  11:19 AM

## 2022-11-22 NOTE — ED NOTES
"Pt removed SIM card from phone prior to nurse charging it for him \"so you dont look through my phone\".  Sitter remains with pt, crackers and juice provided, declines anything else at this time.  "

## 2022-11-22 NOTE — ED NOTES
Provider aware of pt want to leave.  Pt given his belongings from locker.  Pt placed call to friend to pick him up. Refused more to eat or drink or any further resources.

## 2022-11-22 NOTE — PROGRESS NOTES
"Pt got up and shut the door after this nurse came to sit for Kirill MELENDREZ's lunch break.  Koko, from security, came thru on rounds and pt came back to door and opened it, then went back into room and mummbled \"Officer Koko, you're an asshole and you can just Fuck off.\"  Koko didn't hear pt and this nurse advised him that it wasn't anything good and he continued on rounds.  Pt got back up after a few minutes and came to ask this nurse \"Whats up?  Why are you here?  I don't like you.  I don't like being stared at.\"  Advised pt I wasn't staring at him but at the computer.  \"Well what's going on?  Why am I here?  I don't like you man\" Advised pt I'd call primary RN.  Primary RN called.  In meeting.  Message given to Taniya CHAMPAGNE RN.  Pt updated.  Back to bed with door shut.  "

## 2022-11-22 NOTE — CONSULTS
"Diagnostic Evaluation Consultation  Crisis Assessment    Patient was assessed: Yovanny  Patient location: [facility] ED   Was a release of information signed: Yes. Providers included on the release: [facility]       Referral Data and Chief Complaint  Galen Jarrett is a 32 year old, who uses he/him pronouns, and presents to the ED via police. Patient is referred to the ED by community provider(s). Patient is presenting to the ED for the following concerns: mental health evaluation.      Informed Consent and Assessment Methods     Patient is his own guardian. Writer met with patient and explained the crisis assessment process, including applicable information disclosures and limits to confidentiality, assessed understanding of the process, and obtained consent to proceed with the assessment. Patient was observed to be able to participate in the assessment as evidenced by verbal understanding/agreement . Assessment methods included conducting a formal interview with patient, review of medical records, collaboration with medical staff, and obtaining relevant collateral information from family and community providers when available..     Over the course of this crisis assessment provided reassurance, offered validation, engaged patient in problem solving and disposition planning and provided psychoeducation. Patient's response to interventions was receptive.      Summary of Patient Situation   Galen Jarrett was brought to the ED by police due to making homicidal threats and running into traffic when the police arrived. The pt initially did not follow commands and displayed erraticbehavior. His speech was hyperverbal and nonsensical upon initially arriving at the ED. PD reported that pt is not violent, just unable to follow commands and \"not making sense\" The pt has a hx of Dysthymic disorder, EVETTE, PTSD, and ADHD. The pt has a hx of abusing meth. He reports he has been sober " from drugs and alcohol for the past couple of years, but refused to provide a urine sample. The pt was staying with friends and at various shelters in the [facility] previously and recently relocated to the [facility].The pt had recent appointments to establish care through [facility]. He was last seen at the clinic on 11/15 and they referred him to be evaluated at the ED ([facility]). The pt was further evaluated and he requested getting a refill for his Adderall medication and that he had a sufficient amount of his other meds. Per chart notes, the pt met with a  through the hospital and she facilitated in coordinating pt getting set up with . The pt has been staying at [facility] and was encouraged to complete the VI-SPDAT assessment. The pt was also receiving his benefits through [facility]. The pt was determined to be safe to discharge and his mediation was noted to be refilled at [facility]. Today, the pt reports he left [place] to travel to the [place] area due to feeling that things were not working out in the [facility]. He reported that a lady at the gas station continued to talk to him and he became annoyed. He reported that the police arrived undercover and so he was trying to run away and denied having any intentions to harm himself or others. The pt was able to further calm down and converse and made appropriate responses to questions asked throughout the DEC evaluation. The pt denied experiencing any SI, HI and/or psychosis symptoms. He denied having any previous SI attempts. He denied having any legal issues. Chart notes indicate that he was previously under commitment, but is not currently. He reported that he wanted to get assistance with refilling his Adderall medication here, otherwise would go back to [place] to get pick it up at the pharmacy. He also requested getting assistance with housing  and getting a place to stay or to obtain a voucher for a hotel. The pt was calm and cooperative during the DEC assessment, but later refused to provide a urine sample.     Brief Psychosocial History  The pt is currently homeless. He was staying at shelters in the [place] area for a while and most recently has been staying in shelters in the [facility]. The pt reports of having family and friends that live in the [facility] area. The pt reports he does not want to be around his friends due to several of them using drugs. The pt denies having any legal issues. Chart note records indicate pt has a hx of being under commitment in the past, but is not currently. The pt is not working, but reports of volunteering through [employer]. The pt receives food stamps and has been working with [facility] to get set up with case management and housing.     Significant Clinical History  The pt has a hx of being diagnosed with Dysthymic disorder, EVETTE, PTSD, & ADHD. The pt has a hx of being hospitalized in 2018 and 2014 at [facility]. The pt has participated in several CD treatment programs and has a hx of detox treatment in the past. The pt reports he continues to participate in a virtual outpatient CD treatment program in [place] (could not recall the name). The pt recently established care and  through [facility]. The pt has a psychiatrist for medication management.     Collateral Information  Reviewed pt's chart in Epic, consulted/collaborated with ED provider, Telly Cody      Risk Assessment  Leming Suicide Severity Rating Scale Full Clinical Version:  Suicidal Ideation  1. Wish to be Dead (Lifetime): No  2. Non-Specific Active Suicidal Thoughts (Lifetime): No     Suicidal Behavior  Actual Attempt (Lifetime): No  Has subject engaged in non-suicidal self-injurious behavior? (Lifetime): No  Interrupted Attempts (Lifetime): No  Aborted or Self-Interrupted Attempt (Lifetime): No  Preparatory  Acts or Behavior (Lifetime): No  C-SSRS Risk (Lifetime/Recent)  Calculated C-SSRS Risk Score (Lifetime/Recent): No Risk Indicated       Validity of evaluation  impacted by presenting factors during interview. Pt has other risk factors such as possible substance abuse (unable to confirm), hx of substance abuse, and homelessness    Environmental or Psychosocial Events: challenging interpersonal relationships, geographic isolation from supports, impulsivity/recklessness, unstable housing, homelessness, excessive debt, poor finances and other life stressors  Chronic Risk Factors: history of psychiatric hospitalization and history of abuse or neglect   Warning Signs: acting reckless or engaging in risky activities, anxiety, agitation, unable to sleep, sleeping all the time and recent discharges from emergency department or inpatient psychiatric care  Protective Factors: help seeking, sense of self-efficacy and/or positive self-esteem, optimistic outlook - identification of future goals and other identified factors which may mitigate risk for suicide: established outpatient providers and [facility] resources          Does the patient have access to lethal means? No     Does the patient engage in non-suicidal self-injurious behavior (NSSI/SIB)? no     Does the patient have thoughts of harming others? No     Is the patient engaging in sexually inappropriate behavior?  no        Current Substance Abuse     Is there recent substance abuse? Pt has a lengthy hx of abusing meth. He reported of being sober for the past couple of years, but refused to provide a urine sample.      Was a urine drug screen or blood alcohol level obtained: No       Mental Status Exam     Affect: Appropriate and Other: Dramatic and Blunted at first, but pt's responses became more appropriate later on   Appearance: Disheveled    Attention Span/Concentration: Attentive  Eye Contact: Engaged   Fund of Knowledge: Appropriate    Language /Speech Content: Fluent    Language /Speech Volume: Normal    Language /Speech Rate/Productions: Normal    Recent Memory: Intact   Remote Memory: Intact   Mood: Irritable and Normal    Orientation to Person: Yes    Orientation to Place: Yes   Orientation to Time of Day: Yes    Orientation to Date: Yes    Situation (Do they understand why they are here?): Yes    Psychomotor Behavior: Hyperactive    Thought Content: Clear   Thought Form: Goal Directed and Intact      History of commitment: Yes chart notes indicate hx of being on commitment, but pt is not currently        Medication    Psychotropic medications: Yes. Pt is currently taking Wellbutrin, Zoloft, Adderall, Seroquel as needed . Medication compliant: Yes. Recent medication changes: No  Medication changes made in the last two weeks: No       Current Care Team    Primary Care Provider: Yes. Name: KRISTINA Arango, CNP. Location: [place], [place] . Date of last visit: 11/15/22. Frequency: n/a. Perceived helpfulness: n/a.  Psychiatrist: Yes. Name: Dr King Figueredo MD . Location: [place], [place] . Date of last visit: n/a. Frequency: n/a. Perceived helpfulness: n/a.  Therapist: No  : No     CTSS or ARMHS: No  ACT Team: No  Other: Yes. Name: Shayna Cano. Location: [facility] . Date of last visit: 11.15.22. Frequency: n/a. Perceived helpfulness: n/a .      Diagnosis    311 (F32.8) Other/unspec. Depressive Disorder   Attention-Deficit/Hyperactivity Disorder  314.01 (F90.2) Combined presentation - by history   309.81 (F43.10) Posttraumatic Stress Disorder (includes Posttraumatic Stress Disorder for Children 6 Years and Younger)  Without dissociative symptoms - by history     Clinical Summary and Substantiation of Recommendations    After therapeutic assessment, intervention and aftercare planning by ED care team and LM and in consultation with attending provider, the patient's circumstances and mental state were appropriate  "for transferring to a crisis facility. A this time the pt is not presenting as an acute risk to self or others due to the following factors: pt denies experiencing SI, HI and/or psychosis symptoms. The pt has no hx of previous SI attempts. The pt was initially agitated and displayed disorganized thinking and pressured/tangential speech when he initially arrived at the ED. The pt was later able to calm down and presented as being more coherent and gave appropriate responses. The PD reports that pt was not violent, but rather \"did not make sense,\" when seeing pt at the gas station. The pt has several outpatient provider supports and community resources set up for him in [place], but he is currently homeless. The pt reports he feels safe to discharge if set up with a place to stay in addition to requesting a refill of his Adderall medication. Hence, the recommendation is for pt to transfer to a crisis facility. The hospital  was requested to consult with pt while in the ED as well. The pt and ED physician are in agreement with this plan.     Disposition    Recommended disposition: Residential Treatment: hospital SW will work with pt to set up        Reviewed case and recommendations with attending provider. Attending Name: Telly Garcia MD      Attending concurs with disposition: Yes       Patient concurs with disposition: Yes       Guardian concurs with disposition: NA      Final disposition: Residential treatment: transfer to crisis stabilization facility when available one is found. .       Assessment Details    Patient interview started at: 9:15 am and completed at: 9:40 am.     Total duration spent on the patient case in minutes: 2.50 hrs      CPT code(s) utilized: 32875 - Psychotherapy for Crisis - 60 (30-74*) min       Marilyn Barrientos MA, MIKE, Psychotherapist  DEC - Triage & Transition Services  Callback: [phone]                "

## 2023-04-13 ENCOUNTER — OFFICE VISIT (OUTPATIENT)
Dept: FAMILY MEDICINE | Facility: CLINIC | Age: 33
End: 2023-04-13
Payer: COMMERCIAL

## 2023-04-13 VITALS
HEART RATE: 93 BPM | WEIGHT: 185.98 LBS | OXYGEN SATURATION: 97 % | DIASTOLIC BLOOD PRESSURE: 70 MMHG | SYSTOLIC BLOOD PRESSURE: 116 MMHG | TEMPERATURE: 98.2 F | BODY MASS INDEX: 26.69 KG/M2

## 2023-04-13 DIAGNOSIS — F41.9 ANXIETY: Primary | ICD-10-CM

## 2023-04-13 PROCEDURE — 99203 OFFICE O/P NEW LOW 30 MIN: CPT | Performed by: FAMILY MEDICINE

## 2023-04-13 PROCEDURE — 96127 BRIEF EMOTIONAL/BEHAV ASSMT: CPT | Performed by: FAMILY MEDICINE

## 2023-04-13 RX ORDER — ATOMOXETINE 25 MG/1
1 CAPSULE ORAL DAILY
COMMUNITY
Start: 2023-03-26

## 2023-04-13 RX ORDER — BUPROPION HYDROCHLORIDE 300 MG/1
300 TABLET ORAL DAILY
Qty: 90 TABLET | Refills: 0 | Status: SHIPPED | OUTPATIENT
Start: 2023-04-13

## 2023-04-13 RX ORDER — BUPROPION HYDROCHLORIDE 300 MG/1
1 TABLET ORAL DAILY
COMMUNITY
Start: 2023-03-26 | End: 2023-04-13

## 2023-04-13 ASSESSMENT — PATIENT HEALTH QUESTIONNAIRE - PHQ9
10. IF YOU CHECKED OFF ANY PROBLEMS, HOW DIFFICULT HAVE THESE PROBLEMS MADE IT FOR YOU TO DO YOUR WORK, TAKE CARE OF THINGS AT HOME, OR GET ALONG WITH OTHER PEOPLE: SOMEWHAT DIFFICULT
SUM OF ALL RESPONSES TO PHQ QUESTIONS 1-9: 1
SUM OF ALL RESPONSES TO PHQ QUESTIONS 1-9: 1

## 2023-04-13 NOTE — PROGRESS NOTES
Assessment & Plan     Anxiety  Patient with anxiety we went ahead and refilled his Wellbutrin until he get back to see his primary provider.  We declined refilling his controlled substances and asked that he see his usual provider for this.  - buPROPion (WELLBUTRIN XL) 300 MG 24 hr tablet; Take 1 tablet (300 mg) by mouth daily             Nicotine/Tobacco Cessation:  He reports that he has been smoking cigarettes. He has never used smokeless tobacco.  Nicotine/Tobacco Cessation Plan:             Sunny Wyatt MD  [facility]    Danny Aaron is a 32 year old, presenting for the following health issues:  Medication Request (Here for a couple months for work. 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 Needs refill of Adderall XR 20mg and 30mg and bupropion XL 300mg. )        4/13/2023     8:16 AM   Additional Questions   Roomed by Niko     History of Present Illness       Reason for visit:  Med managment    He eats 0-1 servings of fruits and vegetables daily.He consumes 0 sweetened beverage(s) daily.He exercises with enough effort to increase his heart rate 30 to 60 minutes per day.  He exercises with enough effort to increase his heart rate 5 days per week.   He is taking medications regularly.    Today's PHQ-9         PHQ-9 Total Score: 1    PHQ-9 Q9 Thoughts of better off dead/self-harm past 2 weeks :   Not at all    How difficult have these problems made it for you to do your work, take care of things at home, or get along with other people: Somewhat difficult               Review of Systems         Objective    /70 (BP Location: Right arm, Patient Position: Sitting, Cuff Size: Adult Large)   Pulse 93   Temp 98.2  F (36.8  C) (Temporal)   Wt 84.4 kg (185 lb 15.7 oz)   SpO2 97%   BMI 26.69 kg/m    Body mass index is 26.69 kg/m .  Physical Exam   Patient is alert in no acute distress calm and cooperative

## 2023-04-22 ENCOUNTER — HEALTH MAINTENANCE LETTER (OUTPATIENT)
Age: 33
End: 2023-04-22

## 2024-02-19 ENCOUNTER — HOSPITAL ENCOUNTER (EMERGENCY)
Facility: HOSPITAL | Age: 34
Discharge: HOME | End: 2024-02-19
Attending: EMERGENCY MEDICINE | Admitting: EMERGENCY MEDICINE
Payer: COMMERCIAL

## 2024-02-19 VITALS
SYSTOLIC BLOOD PRESSURE: 129 MMHG | WEIGHT: 172 LBS | HEART RATE: 99 BPM | DIASTOLIC BLOOD PRESSURE: 63 MMHG | BODY MASS INDEX: 24.08 KG/M2 | HEIGHT: 71 IN | RESPIRATION RATE: 20 BRPM | OXYGEN SATURATION: 98 % | TEMPERATURE: 98 F
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
## 2024-02-19 PROCEDURE — 250N000009 HC RX 250: Performed by: EMERGENCY MEDICINE

## 2024-02-19 PROCEDURE — 99283 EMERGENCY DEPT VISIT LOW MDM: CPT

## 2024-02-19 RX ORDER — GINSENG 100 MG
CAPSULE ORAL ONCE
Status: COMPLETED | OUTPATIENT
Start: 2024-02-19 | End: 2024-02-19

## 2024-02-19 RX ADMIN — BACITRACIN: 500 OINTMENT TOPICAL at 09:10

## 2024-02-19 NOTE — ED PROVIDER NOTES
EMERGENCY DEPARTMENT ENCOUNTER      NAME: Galen Jarrett  AGE: 33 year old male  YOB: 1990  MRN: [id number]  EVALUATION DATE & TIME: No admission date for patient encounter.    PCP: No Ref-Primary, Physician    ED PROVIDER: Alethea Pandey M.D.      Chief Complaint   Patient presents with    Hand Pain         FINAL IMPRESSION:  1. Ring or other jewelry causing external constriction, initial encounter          ED COURSE & MEDICAL DECISION MAKING:    ED Course as of 02/19/24 1308   Mon Feb 19, 2024   0831 Pt with left little finger distal swelling around and distal to ring placed 1w2d ago, neurovascularly intact though, he is ok with plan to cut off ring. Patient discharged after being provided with extensive anticipatory guidance and given return precautions, importance of PMD follow-up emphasized.      PROCEDURE: Foreign Body Removal   INDICATIONS: Foreign Body   PROCEDURE PROVIDER: Dr Alethea Pandey   SITE: Left little finger   CONSENT: Risks, benefits and alternatives were discussed with and Verbal consent was obtained from Patient.   TIME OUT: Universal protocol was followed. TIME OUT conducted just prior to starting procedure confirmed patient identity, site/side, procedure, patient position, and availability of correct equipment. Yes   MEDICATION: N/A, no sedation meds were given   DESCRIPTION OF PROCEDURE: Ring cutter used to cut ring off left little finger by cutting through metal in two places with skin block in place. Ring was removed, reddened skin bandaged with band-aid with bacitracin applied.   COMPLICATIONS: Patient tolerated procedure well, without complication        Pertinent Labs & Imaging studies reviewed. (See chart for details)    N95 worn  A face shield was worn also  COVID PPE    Medical Decision Making  Obtained supplemental history:Supplemental history obtained?: Documented in chart  Reviewed external records: External records reviewed?: No  Care impacted by chronic  "illness:Mental Health  Care significantly affected by social determinants of health:Access to Affordable Health Care and Alcohol Abuse and/or Recreational Drug Use  Did you consider but not order tests?: Work up considered but not performed and documented in chart, if applicable  Did you interpret images independently?: Independent interpretation of ECG and images noted in documentation, when applicable.  Consultation discussion with other provider:Did you involve another provider (consultant, , pharmacy, etc.)?: No  Discharge. No recommendations on prescription strength medication(s). See documentation for any additional details.    At the conclusion of the encounter I discussed the results of all of the tests and the disposition. The questions were answered. The patient or family acknowledged understanding and was agreeable with the care plan.     MEDICATIONS GIVEN IN THE EMERGENCY:  Medications   bacitracin ointment ( Topical $Given 2/19/24 9504)       NEW PRESCRIPTIONS STARTED AT TODAY'S ER VISIT  Discharge Medication List as of 2/19/2024  8:41 AM             =================================================================    HPI      Galen Jarrett is a 33 year old male with no contributory PMHx, who presents to the ED today via walk-in with hand pain.    Patient reports he had a ring stuck in his right 2nd digit and left 5th digit for about 1 week and 2 days and presents to the ER to get ring removed. No other reported complaints or concerns at this time. It is sore and achy mildly, worse with bending finger, no loss of sensation or strength.      REVIEW OF SYSTEMS   All other systems reviewed and are negative except as noted above in HPI.    PAST MEDICAL HISTORY:  Past Medical History:   Diagnosis Date    Anxiety     Asthma     Chemical dependency (H)     \"speed\", states he no longer uses.    Uncomplicated asthma        PAST SURGICAL HISTORY:  Past Surgical History:   Procedure Laterality Date    " "ESOPHAGOSCOPY, GASTROSCOPY, DUODENOSCOPY (EGD), COMBINED  9/12/2012    Procedure: COMBINED ESOPHAGOSCOPY, GASTROSCOPY, DUODENOSCOPY (EGD), BIOPSY SINGLE OR MULTIPLE;  Abd Pain- EDG  [facility] gave pt packet and RX;  Surgeon: Loc Vigil MD;  Location: [facility] OR       CURRENT MEDICATIONS:    ADDERALL XR 20 MG 24 hr capsule  ADDERALL XR 30 MG 24 hr capsule  atomoxetine (STRATTERA) 25 MG capsule  buPROPion (WELLBUTRIN XL) 300 MG 24 hr tablet  QUEtiapine (SEROQUEL) 100 MG tablet  sertraline (ZOLOFT) 100 MG tablet        ALLERGIES:  Allergies   Allergen Reactions    Erythromycin Hives and Rash     Other reaction(s): *Unknown - Childhood Rxn, As a Child    Sulfa Antibiotics Hives and Rash     Hives      Amphetamine-Dextroamphetamine Other (See Comments)     Hallunications, hx addiction    Haloperidol Unknown    Olanzapine Other (See Comments) and Unknown     unknown  Per pt's record       FAMILY HISTORY:  Family History   Problem Relation Age of Onset    Gastrointestinal Disease Paternal Grandmother        SOCIAL HISTORY:   Social History     Socioeconomic History    Marital status: Single   Tobacco Use    Smoking status: Every Day     Types: Cigarettes    Smokeless tobacco: Never   Vaping Use    Vaping Use: Never used   Substance and Sexual Activity    Alcohol use: No    Drug use: Yes     Types: Marijuana, Cocaine    Sexual activity: Yes   Social History Narrative    ** Merged History Encounter **            VITALS:  Patient Vitals for the past 24 hrs:   BP Temp Pulse Resp SpO2 Height Weight   02/19/24 0816 129/63 98  F (36.7  C) 99 20 98 % 1.791 m (5' 10.5\") 78 kg (172 lb)       PHYSICAL EXAM    GENERAL: Awake, alert.  In no acute distress.   HEENT: Normocephalic, atraumatic.  Pupils equal, round and reactive.  Conjunctiva normal.  EOMI.  NECK: No stridor or apparent deformity.  EXTREMITIES: No lower extremity swelling or edema. Ring on left little finger at MCPJ tight with distal finger swelling.  NEURO: " Alert and oriented to person, place and time.  Cranial nerves grossly intact.  No focal motor deficit.  PSYCH: Normal mood and affect  SKIN: No rashes          I, Christie Sarmiento, am serving as a scribe to document services personally performed by Dr. Alethea Pandey based on my observation and the provider's statements to me. I, Alethea Pandey MD attest that Christie Sarmiento is acting in a scribe capacity, has observed my performance of the services and has documented them in accordance with my direction.       Alethea Pandey MD  02/19/24 4162

## 2024-02-19 NOTE — ED NOTES
Removed x2 rings for patient and cleand wounds on fingers from where rings were growing into them (right pointer and L pinky fingers.)

## 2024-06-29 ENCOUNTER — HEALTH MAINTENANCE LETTER (OUTPATIENT)
Age: 34
End: 2024-06-29

## 2025-07-13 ENCOUNTER — HEALTH MAINTENANCE LETTER (OUTPATIENT)
Age: 35
End: 2025-07-13